# Patient Record
Sex: MALE | Race: WHITE | NOT HISPANIC OR LATINO | Employment: FULL TIME | URBAN - METROPOLITAN AREA
[De-identification: names, ages, dates, MRNs, and addresses within clinical notes are randomized per-mention and may not be internally consistent; named-entity substitution may affect disease eponyms.]

---

## 2017-06-01 DIAGNOSIS — Z11.59 NEED FOR HEPATITIS C SCREENING TEST: Primary | ICD-10-CM

## 2018-09-18 ENCOUNTER — OFFICE VISIT (OUTPATIENT)
Dept: FAMILY MEDICINE | Facility: CLINIC | Age: 63
End: 2018-09-18
Payer: COMMERCIAL

## 2018-09-18 VITALS
OXYGEN SATURATION: 98 % | WEIGHT: 198.13 LBS | TEMPERATURE: 98 F | HEART RATE: 80 BPM | HEIGHT: 68 IN | BODY MASS INDEX: 30.03 KG/M2 | SYSTOLIC BLOOD PRESSURE: 110 MMHG | DIASTOLIC BLOOD PRESSURE: 70 MMHG

## 2018-09-18 DIAGNOSIS — E11.9 TYPE 2 DIABETES MELLITUS WITHOUT COMPLICATION, WITHOUT LONG-TERM CURRENT USE OF INSULIN: Primary | ICD-10-CM

## 2018-09-18 DIAGNOSIS — E78.49 OTHER HYPERLIPIDEMIA: ICD-10-CM

## 2018-09-18 PROCEDURE — 99213 OFFICE O/P EST LOW 20 MIN: CPT | Mod: ,,, | Performed by: FAMILY MEDICINE

## 2018-09-18 PROCEDURE — 3008F BODY MASS INDEX DOCD: CPT | Mod: ,,, | Performed by: FAMILY MEDICINE

## 2018-09-18 RX ORDER — TIZANIDINE 4 MG/1
4 TABLET ORAL EVERY 6 HOURS PRN
COMMUNITY
End: 2018-09-24 | Stop reason: SDUPTHER

## 2018-09-18 RX ORDER — METFORMIN HYDROCHLORIDE 500 MG/1
500 TABLET ORAL 2 TIMES DAILY WITH MEALS
COMMUNITY
End: 2018-12-19 | Stop reason: SDUPTHER

## 2018-09-18 RX ORDER — MONTELUKAST SODIUM 10 MG/1
10 TABLET ORAL NIGHTLY
COMMUNITY
End: 2019-01-09 | Stop reason: SDUPTHER

## 2018-09-18 RX ORDER — ATORVASTATIN CALCIUM 20 MG/1
20 TABLET, FILM COATED ORAL DAILY
COMMUNITY
End: 2019-01-07 | Stop reason: SDUPTHER

## 2018-09-18 NOTE — PROGRESS NOTES
SUBJECTIVE:    Patient ID: Sushil Lucas is a 62 y.o. male.    Chief Complaint: Diabetes; Hyperlipidemia; and Tremors (hand)    HPI  Very pleasant 62-year-old male new to this provider past medical history of  Diabetes and hyperlipidemia, patient also has essential tremor on propanolol. Last A1c is unknown last lipid panels unknown he was a patient of Dr. Pisano's we do not have copies of her notes.    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    Past Medical History:   Diagnosis Date    Diabetes mellitus, type 2     Hyperlipidemia      Social History     Socioeconomic History    Marital status:      Spouse name: Not on file    Number of children: Not on file    Years of education: Not on file    Highest education level: Not on file   Social Needs    Financial resource strain: Not on file    Food insecurity - worry: Not on file    Food insecurity - inability: Not on file    Transportation needs - medical: Not on file    Transportation needs - non-medical: Not on file   Occupational History    Not on file   Tobacco Use    Smoking status: Former Smoker    Smokeless tobacco: Never Used    Tobacco comment: Only when young; stopped many years ago   Substance and Sexual Activity    Alcohol use: No     Comment: 1-2 times per year    Drug use: No    Sexual activity: Not on file   Other Topics Concern    Not on file   Social History Narrative    Not on file     Past Surgical History:   Procedure Laterality Date    AV FISTULA REPAIR      HERNIA REPAIR      SPINE SURGERY  12/2006    C6/7 ACDF     Family History   Problem Relation Age of Onset    Heart disease Father      Current Outpatient Medications    Medication Sig Dispense Refill    atorvastatin (LIPITOR) 20 MG tablet Take 20 mg by mouth once daily.      metFORMIN (GLUCOPHAGE) 500 MG tablet Take 500 mg by mouth 2 (two) times daily with meals.      montelukast (SINGULAIR) 10 mg tablet Take 10 mg by mouth every evening.      naproxen sodium (ANAPROX) 220 MG tablet Take 220 mg by mouth every 12 (twelve) hours.      propranolol (INNOPRAN XL) 80 mg 24 hr capsule Take 1 capsule by mouth 3 (three) times daily.      ranitidine (ZANTAC) 150 MG tablet Take 150 mg by mouth 2 (two) times daily.      tiZANidine (ZANAFLEX) 4 MG tablet Take 4 mg by mouth every 6 (six) hours as needed.       No current facility-administered medications for this visit.      Review of patient's allergies indicates:  No Known Allergies    Review of Systems   Constitutional: Negative for activity change, appetite change, fatigue and fever.   HENT: Negative for congestion, ear pain, hearing loss, postnasal drip, sinus pressure, sinus pain, sneezing and sore throat.    Eyes: Negative for photophobia and pain.   Respiratory: Negative for cough, chest tightness, shortness of breath and wheezing.    Cardiovascular: Negative for chest pain and leg swelling.   Gastrointestinal: Negative for abdominal distention, abdominal pain, blood in stool, constipation, diarrhea, nausea and vomiting.   Endocrine: Negative for cold intolerance, heat intolerance, polydipsia and polyuria.   Genitourinary: Negative for difficulty urinating, dysuria, flank pain, frequency, hematuria and urgency.   Musculoskeletal: Negative for arthralgias, back pain, joint swelling, myalgias and neck pain.   Skin: Negative for pallor and rash.   Allergic/Immunologic: Negative for environmental allergies and food allergies.   Neurological: Negative for dizziness, weakness, light-headedness and headaches.   Hematological: Does not bruise/bleed easily.   Psychiatric/Behavioral: Negative for confusion, decreased concentration and  "sleep disturbance. The patient is not nervous/anxious.           Blood pressure 110/70, pulse 80, temperature 97.6 °F (36.4 °C), height 5' 8" (1.727 m), weight 89.9 kg (198 lb 1.6 oz), SpO2 98 %. Body mass index is 30.12 kg/m².   Objective:      Physical Exam   Constitutional: He is oriented to person, place, and time. He appears well-developed and well-nourished. No distress.   HENT:   Head: Normocephalic and atraumatic.   Right Ear: External ear normal.   Left Ear: External ear normal.   Mouth/Throat: Oropharynx is clear and moist.   Eyes: Conjunctivae are normal. Pupils are equal, round, and reactive to light. Right eye exhibits no discharge. Left eye exhibits no discharge.   Cardiovascular: Normal rate, regular rhythm and normal heart sounds.   No murmur heard.  Pulmonary/Chest: Effort normal and breath sounds normal.   Neurological: He is alert and oriented to person, place, and time.   Skin: Skin is warm and dry. Capillary refill takes less than 2 seconds.           Assessment:       1. Type 2 diabetes mellitus without complication, without long-term current use of insulin         Plan:           Type 2 diabetes mellitus without complication, without long-term current use of insulin  -     Hemoglobin A1c; Future; Expected date: 09/18/2018  -     Lipid panel; Future; Expected date: 09/18/2018    Patient doing well today no complaints does not need any refills on his medications will get his hemoglobin A1c and lipid panel before next visit patient return in 6 months.                    "

## 2018-09-25 RX ORDER — TIZANIDINE 4 MG/1
4 TABLET ORAL EVERY 6 HOURS PRN
Qty: 60 TABLET | Refills: 0 | Status: SHIPPED | OUTPATIENT
Start: 2018-09-25 | End: 2019-01-09 | Stop reason: SDUPTHER

## 2018-11-13 ENCOUNTER — TELEPHONE (OUTPATIENT)
Dept: FAMILY MEDICINE | Facility: CLINIC | Age: 63
End: 2018-11-13

## 2018-12-19 DIAGNOSIS — E11.9 TYPE 2 DIABETES MELLITUS WITHOUT COMPLICATION, WITHOUT LONG-TERM CURRENT USE OF INSULIN: Primary | ICD-10-CM

## 2018-12-20 RX ORDER — METFORMIN HYDROCHLORIDE 500 MG/1
500 TABLET ORAL 2 TIMES DAILY WITH MEALS
Qty: 60 TABLET | Refills: 0 | Status: SHIPPED | OUTPATIENT
Start: 2018-12-20 | End: 2019-01-25 | Stop reason: SDUPTHER

## 2019-01-07 DIAGNOSIS — E11.9 TYPE 2 DIABETES MELLITUS WITHOUT COMPLICATION, WITHOUT LONG-TERM CURRENT USE OF INSULIN: ICD-10-CM

## 2019-01-07 RX ORDER — ATORVASTATIN CALCIUM 20 MG/1
20 TABLET, FILM COATED ORAL DAILY
Qty: 30 TABLET | Refills: 2 | Status: SHIPPED | OUTPATIENT
Start: 2019-01-07 | End: 2019-03-18 | Stop reason: SDUPTHER

## 2019-01-07 NOTE — TELEPHONE ENCOUNTER
Patient's wife called her insurance company and they will not cover the lab work ordered until the end of May this year. I pended the medication he needs a refill on currently. Is it at all possible to fill it and when may arrives he can get the blood work done?

## 2019-01-11 RX ORDER — MONTELUKAST SODIUM 10 MG/1
10 TABLET ORAL NIGHTLY
Qty: 30 TABLET | Refills: 2 | Status: SHIPPED | OUTPATIENT
Start: 2019-01-11 | End: 2019-03-18 | Stop reason: SDUPTHER

## 2019-01-11 RX ORDER — TIZANIDINE 4 MG/1
4 TABLET ORAL EVERY 6 HOURS PRN
Qty: 60 TABLET | Refills: 0 | Status: SHIPPED | OUTPATIENT
Start: 2019-01-11 | End: 2019-04-08 | Stop reason: SDUPTHER

## 2019-01-25 DIAGNOSIS — E11.9 TYPE 2 DIABETES MELLITUS WITHOUT COMPLICATION, WITHOUT LONG-TERM CURRENT USE OF INSULIN: ICD-10-CM

## 2019-01-28 RX ORDER — METFORMIN HYDROCHLORIDE 500 MG/1
TABLET ORAL
Qty: 60 TABLET | Refills: 0 | Status: SHIPPED | OUTPATIENT
Start: 2019-01-28 | End: 2019-02-28 | Stop reason: SDUPTHER

## 2019-02-28 DIAGNOSIS — E11.9 TYPE 2 DIABETES MELLITUS WITHOUT COMPLICATION, WITHOUT LONG-TERM CURRENT USE OF INSULIN: ICD-10-CM

## 2019-02-28 RX ORDER — METFORMIN HYDROCHLORIDE 500 MG/1
TABLET ORAL
Qty: 60 TABLET | Refills: 0 | Status: SHIPPED | OUTPATIENT
Start: 2019-02-28 | End: 2019-03-18 | Stop reason: SDUPTHER

## 2019-03-11 LAB
BUN SERPL-MCNC: 14 MG/DL (ref 8–20)
CALCIUM SERPL-MCNC: 9.1 MG/DL (ref 7.7–10.4)
CHLORIDE: 102 MMOL/L (ref 98–110)
CO2 SERPL-SCNC: 31 MMOL/L (ref 22.8–31.6)
CREATININE: 0.8 MG/DL (ref 0.6–1.4)
GLUCOSE: 153 MG/DL (ref 70–99)
POTASSIUM SERPL-SCNC: 4.8 MMOL/L (ref 3.5–5)
SODIUM: 141 MMOL/L (ref 134–144)

## 2019-03-11 RX ORDER — FLUOROMETHOLONE 1 MG/ML
SUSPENSION/ DROPS OPHTHALMIC
COMMUNITY
Start: 2019-02-27

## 2019-03-18 ENCOUNTER — OFFICE VISIT (OUTPATIENT)
Dept: FAMILY MEDICINE | Facility: CLINIC | Age: 64
End: 2019-03-18
Payer: COMMERCIAL

## 2019-03-18 VITALS
RESPIRATION RATE: 16 BRPM | OXYGEN SATURATION: 99 % | BODY MASS INDEX: 30.63 KG/M2 | WEIGHT: 202.13 LBS | TEMPERATURE: 98 F | SYSTOLIC BLOOD PRESSURE: 132 MMHG | HEART RATE: 75 BPM | HEIGHT: 68 IN | DIASTOLIC BLOOD PRESSURE: 86 MMHG

## 2019-03-18 DIAGNOSIS — E78.49 OTHER HYPERLIPIDEMIA: ICD-10-CM

## 2019-03-18 DIAGNOSIS — E11.9 TYPE 2 DIABETES MELLITUS WITHOUT COMPLICATION, WITHOUT LONG-TERM CURRENT USE OF INSULIN: Primary | ICD-10-CM

## 2019-03-18 DIAGNOSIS — Z11.59 ENCOUNTER FOR HEPATITIS C SCREENING TEST FOR LOW RISK PATIENT: ICD-10-CM

## 2019-03-18 DIAGNOSIS — J01.10 ACUTE NON-RECURRENT FRONTAL SINUSITIS: ICD-10-CM

## 2019-03-18 DIAGNOSIS — M19.90 ARTHRITIS: ICD-10-CM

## 2019-03-18 PROCEDURE — 3008F BODY MASS INDEX DOCD: CPT | Mod: ,,, | Performed by: FAMILY MEDICINE

## 2019-03-18 PROCEDURE — 3008F PR BODY MASS INDEX (BMI) DOCUMENTED: ICD-10-PCS | Mod: ,,, | Performed by: FAMILY MEDICINE

## 2019-03-18 PROCEDURE — 99214 OFFICE O/P EST MOD 30 MIN: CPT | Mod: ,,, | Performed by: FAMILY MEDICINE

## 2019-03-18 PROCEDURE — 99214 PR OFFICE/OUTPT VISIT, EST, LEVL IV, 30-39 MIN: ICD-10-PCS | Mod: ,,, | Performed by: FAMILY MEDICINE

## 2019-03-18 RX ORDER — METFORMIN HYDROCHLORIDE 500 MG/1
TABLET ORAL
Qty: 60 TABLET | Refills: 5 | Status: SHIPPED | OUTPATIENT
Start: 2019-03-18 | End: 2019-10-19 | Stop reason: SDUPTHER

## 2019-03-18 RX ORDER — MONTELUKAST SODIUM 10 MG/1
10 TABLET ORAL NIGHTLY
Qty: 30 TABLET | Refills: 5 | Status: SHIPPED | OUTPATIENT
Start: 2019-03-18 | End: 2019-11-06 | Stop reason: SDUPTHER

## 2019-03-18 RX ORDER — CELECOXIB 100 MG/1
100 CAPSULE ORAL 2 TIMES DAILY
Qty: 60 CAPSULE | Refills: 3 | Status: SHIPPED | OUTPATIENT
Start: 2019-03-18 | End: 2019-07-20 | Stop reason: SDUPTHER

## 2019-03-18 RX ORDER — ATORVASTATIN CALCIUM 20 MG/1
20 TABLET, FILM COATED ORAL DAILY
Qty: 30 TABLET | Refills: 5 | Status: SHIPPED | OUTPATIENT
Start: 2019-03-18 | End: 2019-11-18 | Stop reason: SDUPTHER

## 2019-03-18 RX ORDER — AMOXICILLIN AND CLAVULANATE POTASSIUM 875; 125 MG/1; MG/1
1 TABLET, FILM COATED ORAL EVERY 12 HOURS
Qty: 20 TABLET | Refills: 0 | Status: SHIPPED | OUTPATIENT
Start: 2019-03-18 | End: 2019-09-10 | Stop reason: ALTCHOICE

## 2019-03-18 RX ORDER — FLUTICASONE PROPIONATE 50 MCG
1 SPRAY, SUSPENSION (ML) NASAL DAILY
Qty: 9.9 ML | Refills: 0 | Status: SHIPPED | OUTPATIENT
Start: 2019-03-18 | End: 2020-04-21

## 2019-03-18 NOTE — PROGRESS NOTES
SUBJECTIVE:    Patient ID: Sushil Lucas is a 63 y.o. male.    Chief Complaint: Hyperlipidemia and Diabetes    62 yo male presents to clinic to follow up on several chronic medical conditions (DM, Hyperlipidemia) and with 2 new complaints (sinus pain and bilateral knee arthritis).    DM; pt has not had an A1C in several months he is taking his medications, but since she is only on orals she is not checking his blood sugar.    Hyperlipidemia, no lipid panel on file, will have pt get his lipid panel before next visit.    Sinusitis, pt states that he has been having symptoms for 3 weeks with sinus pain and pressure, thick white nasal drainage, the feeling of shifting fluid in his head. Subjective fevers.    Arthritis, pt has bilateral knee pain has had images in the past, pain worse in the morning that improves with activity, no erythema or edema. + crepitus.      Diabetes   He presents for his follow-up diabetic visit. He has type 2 diabetes mellitus. There are no hypoglycemic associated symptoms. Pertinent negatives for hypoglycemia include no headaches. There are no diabetic associated symptoms. Pertinent negatives for diabetes include no chest pain, no fatigue and no weight loss. There are no hypoglycemic complications. Symptoms are stable. There are no diabetic complications. Risk factors for coronary artery disease include dyslipidemia, diabetes mellitus, hypertension and male sex. Current diabetic treatment includes oral agent (monotherapy). He is compliant with treatment all of the time. His weight is stable. He is following a generally healthy diet. When asked about meal planning, he reported none. He has not had a previous visit with a dietitian. He participates in exercise three times a week. Home blood sugar record trend: Unknown no A1C on file. An ACE inhibitor/angiotensin II receptor blocker is not being taken. He does not see a podiatrist.Eye exam is not current.   Hyperlipidemia   This is a chronic  problem. The current episode started more than 1 year ago. Condition status: unknown. Recent lipid tests were reviewed and are normal. Exacerbating diseases include diabetes. Factors aggravating his hyperlipidemia include beta blockers. Pertinent negatives include no chest pain, focal sensory loss, focal weakness, leg pain, myalgias or shortness of breath. Current antihyperlipidemic treatment includes statins. The current treatment provides mild improvement of lipids. There are no compliance problems.    Sinusitis   This is a new problem. The current episode started today. The problem has been gradually worsening since onset. There has been no fever. His pain is at a severity of 3/10. Associated symptoms include congestion, coughing and sinus pressure. Pertinent negatives include no chills, diaphoresis, ear pain, headaches, hoarse voice, neck pain, shortness of breath, sneezing, sore throat or swollen glands. Past treatments include nothing.   Arthritis   Presents for follow-up visit. He complains of pain and stiffness. He reports no joint warmth. The symptoms have been stable. Affected locations include the right knee and left knee. His pain is at a severity of 4/10. Pertinent negatives include no diarrhea, dry eyes, dry mouth, dysuria, fatigue, fever, pain at night, pain while resting, rash, Raynaud's syndrome, uveitis or weight loss.         Past Medical History:   Diagnosis Date    Diabetes mellitus, type 2     Hyperlipidemia      Social History     Socioeconomic History    Marital status:      Spouse name: Not on file    Number of children: Not on file    Years of education: Not on file    Highest education level: Not on file   Social Needs    Financial resource strain: Not on file    Food insecurity - worry: Not on file    Food insecurity - inability: Not on file    Transportation needs - medical: Not on file    Transportation needs - non-medical: Not on file   Occupational History    Not on  file   Tobacco Use    Smoking status: Never Smoker    Smokeless tobacco: Never Used   Substance and Sexual Activity    Alcohol use: No     Comment: 1-2 times per year    Drug use: No    Sexual activity: Not on file   Other Topics Concern    Not on file   Social History Narrative    Not on file     Past Surgical History:   Procedure Laterality Date    AV FISTULA REPAIR      HERNIA REPAIR      SPINE SURGERY  12/2006    C6/7 ACDF     Family History   Problem Relation Age of Onset    Heart disease Father      Current Outpatient Medications   Medication Sig Dispense Refill    atorvastatin (LIPITOR) 20 MG tablet Take 1 tablet (20 mg total) by mouth once daily. 30 tablet 5    fluorometholone 0.1% (FML) 0.1 % DrpS       metFORMIN (GLUCOPHAGE) 500 MG tablet TAKE 1 TABLET(500 MG) BY MOUTH TWICE DAILY WITH MEALS 60 tablet 5    montelukast (SINGULAIR) 10 mg tablet Take 1 tablet (10 mg total) by mouth every evening. 30 tablet 5    propranolol (INNOPRAN XL) 80 mg 24 hr capsule Take 1 capsule (80 mg total) by mouth 3 (three) times daily. 90 capsule 5    ranitidine (ZANTAC) 150 MG tablet Take 150 mg by mouth 2 (two) times daily.      tiZANidine (ZANAFLEX) 4 MG tablet Take 1 tablet (4 mg total) by mouth every 6 (six) hours as needed. 60 tablet 0    amoxicillin-clavulanate 875-125mg (AUGMENTIN) 875-125 mg per tablet Take 1 tablet by mouth every 12 (twelve) hours. 20 tablet 0    celecoxib (CELEBREX) 100 MG capsule Take 1 capsule (100 mg total) by mouth 2 (two) times daily. 60 capsule 3    fluticasone (FLONASE) 50 mcg/actuation nasal spray 1 spray (50 mcg total) by Each Nare route once daily. 9.9 mL 0     No current facility-administered medications for this visit.      Review of patient's allergies indicates:  No Known Allergies    Review of Systems   Constitutional: Negative for activity change, chills, diaphoresis, fatigue, fever and weight loss.   HENT: Positive for congestion, postnasal drip, rhinorrhea, sinus  "pressure and sinus pain. Negative for ear pain, hoarse voice, sneezing and sore throat.    Eyes: Negative for pain, discharge, redness and itching.   Respiratory: Positive for cough and chest tightness. Negative for shortness of breath, wheezing and stridor.    Cardiovascular: Negative for chest pain, palpitations and leg swelling.   Gastrointestinal: Negative for diarrhea, nausea, rectal pain and vomiting.   Genitourinary: Negative for dysuria, flank pain, frequency and urgency.   Musculoskeletal: Positive for arthralgias (bilateral knee pain), arthritis and stiffness. Negative for myalgias and neck pain.   Skin: Negative for rash.   Neurological: Negative for focal weakness and headaches.          Blood pressure 132/86, pulse 75, temperature 97.6 °F (36.4 °C), temperature source Oral, resp. rate 16, height 5' 8" (1.727 m), weight 91.7 kg (202 lb 1.6 oz), SpO2 99 %. Body mass index is 30.73 kg/m².   Objective:      Physical Exam   Constitutional: He is oriented to person, place, and time. He appears well-developed and well-nourished. No distress.   HENT:   Head: Normocephalic and atraumatic.   Right Ear: External ear normal.   Left Ear: External ear normal.   Nose: Nose normal.   Mouth/Throat: No oropharyngeal exudate.   + post nasal drip  + frontal sinus pain with palpation  + nasal discharge     Eyes: Conjunctivae and EOM are normal. Pupils are equal, round, and reactive to light. Right eye exhibits no discharge. Left eye exhibits no discharge.   Neck: Normal range of motion. Neck supple. No thyromegaly present.   Cardiovascular: Normal rate, regular rhythm and normal heart sounds.   No murmur heard.  Pulmonary/Chest: Effort normal and breath sounds normal. No stridor. No respiratory distress. He has no wheezes. He has no rales.   Lymphadenopathy:     He has no cervical adenopathy.   Neurological: He is alert and oriented to person, place, and time.   Skin: Skin is warm and dry. Capillary refill takes less than 2 " seconds. No rash noted. He is not diaphoretic.           Assessment:       1. Type 2 diabetes mellitus without complication, without long-term current use of insulin    2. Other hyperlipidemia    3. Arthritis    4. Acute non-recurrent frontal sinusitis    5. Encounter for hepatitis C screening test for low risk patient         Plan:           Type 2 diabetes mellitus without complication, without long-term current use of insulin  -     Hemoglobin A1c; Future; Expected date: 03/18/2019  -     metFORMIN (GLUCOPHAGE) 500 MG tablet; TAKE 1 TABLET(500 MG) BY MOUTH TWICE DAILY WITH MEALS  Dispense: 60 tablet; Refill: 5  I have asked pt to decrease his carbohydrate intake, will get A1C before next visit to determine if his medications need to be adjusted.    Other hyperlipidemia  -     Lipid panel; Future; Expected date: 03/18/2019  -     atorvastatin (LIPITOR) 20 MG tablet; Take 1 tablet (20 mg total) by mouth once daily.  Dispense: 30 tablet; Refill: 5  No lipid panel on file will get before next visit.    Arthritis  -     celecoxib (CELEBREX) 100 MG capsule; Take 1 capsule (100 mg total) by mouth 2 (two) times daily.  Dispense: 60 capsule; Refill: 3  Will consider intraarticular steroid injections if not improved on celebrex.    Acute non-recurrent frontal sinusitis  -     amoxicillin-clavulanate 875-125mg (AUGMENTIN) 875-125 mg per tablet; Take 1 tablet by mouth every 12 (twelve) hours.  Dispense: 20 tablet; Refill: 0  -     fluticasone (FLONASE) 50 mcg/actuation nasal spray; 1 spray (50 mcg total) by Each Nare route once daily.  Dispense: 9.9 mL; Refill: 0  -     montelukast (SINGULAIR) 10 mg tablet; Take 1 tablet (10 mg total) by mouth every evening.  Dispense: 30 tablet; Refill: 5  Positive frontal sinus pressure suspect bacterial sinusitis, will treat with abx. Pt to follow up if his symptoms persist or get worse or he develops fevers.    Encounter for hepatitis C screening test for low risk patient  -      Hepatitis C antibody; Future; Expected date: 03/18/2019  Routine health maint.    Other orders  -     Cancel: Influenza - Quadrivalent (3 years & older) (PF)  -     Cancel: Tdap Vaccine  -     Cancel: adjuvant AS01B, PF,vial 1 of 2 (SHINGRIX ADJUVANT COMPONENT-PF) Susp; Inject 0.5 mLs into the muscle once. for 1 dose  Dispense: 0.5 mL; Refill: 0  -     propranolol (INNOPRAN XL) 80 mg 24 hr capsule; Take 1 capsule (80 mg total) by mouth 3 (three) times daily.  Dispense: 90 capsule; Refill: 5

## 2019-03-19 ENCOUNTER — TELEPHONE (OUTPATIENT)
Dept: FAMILY MEDICINE | Facility: CLINIC | Age: 64
End: 2019-03-19

## 2019-03-19 RX ORDER — PROPRANOLOL HYDROCHLORIDE 80 MG/1
80 CAPSULE, EXTENDED RELEASE ORAL DAILY
Qty: 30 CAPSULE | Refills: 11 | Status: SHIPPED | OUTPATIENT
Start: 2019-03-19 | End: 2019-05-12 | Stop reason: SDUPTHER

## 2019-03-19 NOTE — TELEPHONE ENCOUNTER
Per pharmacy patient insurance will not cover innopran but instead will cover propranol HCL. I am unsure of the dosage for this medication. Please send to the local pharmacy. Thank you!

## 2019-04-08 RX ORDER — TIZANIDINE 4 MG/1
TABLET ORAL
Qty: 60 TABLET | Refills: 0 | Status: SHIPPED | OUTPATIENT
Start: 2019-04-08 | End: 2019-06-27 | Stop reason: SDUPTHER

## 2019-05-06 ENCOUNTER — CLINICAL SUPPORT (OUTPATIENT)
Dept: OTHER | Facility: CLINIC | Age: 64
End: 2019-05-06
Payer: COMMERCIAL

## 2019-05-06 DIAGNOSIS — Z00.8 HEALTH EXAMINATION IN POPULATION SURVEYS: Primary | ICD-10-CM

## 2019-05-06 PROCEDURE — 82947 PR  ASSAY QUANTITATIVE,BLOOD GLUCOSE: ICD-10-PCS | Mod: QW,S$GLB,, | Performed by: INTERNAL MEDICINE

## 2019-05-06 PROCEDURE — 83036 HEMOGLOBIN GLYCOSYLATED A1C: CPT | Mod: QW,S$GLB,, | Performed by: INTERNAL MEDICINE

## 2019-05-06 PROCEDURE — 80061 LIPID PANEL: CPT | Mod: QW,S$GLB,, | Performed by: INTERNAL MEDICINE

## 2019-05-06 PROCEDURE — 99401 PREV MED CNSL INDIV APPRX 15: CPT | Mod: S$GLB,,, | Performed by: INTERNAL MEDICINE

## 2019-05-06 PROCEDURE — 80061 PR  LIPID PANEL: ICD-10-PCS | Mod: QW,S$GLB,, | Performed by: INTERNAL MEDICINE

## 2019-05-06 PROCEDURE — 83036 POCT GLYCOSYLATED HEMOGLOBIN (HGB A1C): ICD-10-PCS | Mod: QW,S$GLB,, | Performed by: INTERNAL MEDICINE

## 2019-05-06 PROCEDURE — 82947 ASSAY GLUCOSE BLOOD QUANT: CPT | Mod: QW,S$GLB,, | Performed by: INTERNAL MEDICINE

## 2019-05-06 PROCEDURE — 99401 PR PREVENT COUNSEL,INDIV,15 MIN: ICD-10-PCS | Mod: S$GLB,,, | Performed by: INTERNAL MEDICINE

## 2019-05-07 LAB
GLUCOSE SERPL-MCNC: 109 MG/DL (ref 60–140)
HBA1C MFR BLD: 6 % (ref 0–5.7)
POC CHOLESTEROL, HDL: 44 MG/DL (ref 40–?)
POC CHOLESTEROL, LDL: 47 MG/DL (ref ?–160)
POC CHOLESTEROL, TOTAL: 130 MG/DL (ref ?–240)
POC GLUCOSE FASTING: ABNORMAL MG/DL (ref 60–110)
POC TOTAL CHOLESTEROL / HDL RATIO: 3 (ref ?–6)
POC TRIGLYCERIDES: 197 MG/DL (ref ?–160)

## 2019-05-08 VITALS
SYSTOLIC BLOOD PRESSURE: 122 MMHG | HEIGHT: 68 IN | BODY MASS INDEX: 30.31 KG/M2 | WEIGHT: 200 LBS | DIASTOLIC BLOOD PRESSURE: 80 MMHG

## 2019-05-08 LAB
GLUCOSE SERPL-MCNC: 109 MG/DL (ref 60–140)
HBA1C MFR BLD: 6 %
HDLC SERPL-MCNC: 44 MG/DL
POC CHOLESTEROL, LDL (DOCK): 47 MG/DL
POC CHOLESTEROL, TOTAL: 130 MG/DL
TRIGL SERPL-MCNC: 197 MG/DL

## 2019-05-09 ENCOUNTER — TELEPHONE (OUTPATIENT)
Dept: FAMILY MEDICINE | Facility: CLINIC | Age: 64
End: 2019-05-09

## 2019-05-09 NOTE — TELEPHONE ENCOUNTER
Pt wife called and stated  has been taking Propranolol 80 mg one PO TID and get a 90 day supply for a long time but his last refill states he is only to take it once a day with only a 30 day supply. Would like to know if you meant to change it to daily. He does not recall if you did or did not. Please advise

## 2019-05-09 NOTE — TELEPHONE ENCOUNTER
Elizabet I think I put him on the Propranolol (Inderal LA) the LA is for long acting, it is supposed to be a once a day medication, we were going to see if the Long Acting medication work as well as the short acting for his symptoms. Ask If he would prefer the 3 times a day dosing, we can cancel and you can pend the medications he was on previously.

## 2019-05-10 NOTE — TELEPHONE ENCOUNTER
Patient states that he is wanting to go back to what he was used to doing. He feels like the other way was working better than the once a day.

## 2019-05-15 NOTE — TELEPHONE ENCOUNTER
The following medication needs a prior authorization:     Medication Name: propranolol xl    Dosage: 80 mg    Frequency: daily    Directions for use: one tab TID    Diagnosis: HYPERTENSION    Is the request for a reauthorization? NO     Is the patient currently stable on therapy? NEW THERAPY    Please list all therapeutic alternatives previously used with start/end dates and outcome:    TOPROL XL 50 MG  STOPPED IN 9/18/2018 BY ANOTHER PROVIDER WITHOUT AN EXPLAINATION

## 2019-05-16 RX ORDER — PROPRANOLOL HYDROCHLORIDE 80 MG/1
80 TABLET ORAL 3 TIMES DAILY
Qty: 90 TABLET | Refills: 11 | Status: SHIPPED | OUTPATIENT
Start: 2019-05-16 | End: 2020-06-24

## 2019-05-16 RX ORDER — PROPRANOLOL HYDROCHLORIDE 80 MG/1
80 CAPSULE, EXTENDED RELEASE ORAL DAILY
Qty: 30 CAPSULE | Refills: 11 | Status: CANCELLED | OUTPATIENT
Start: 2019-05-16 | End: 2020-05-15

## 2019-06-27 RX ORDER — TIZANIDINE 4 MG/1
TABLET ORAL
Qty: 60 TABLET | Refills: 0 | Status: SHIPPED | OUTPATIENT
Start: 2019-06-27 | End: 2019-09-24 | Stop reason: SDUPTHER

## 2019-07-20 DIAGNOSIS — M19.90 ARTHRITIS: ICD-10-CM

## 2019-07-22 RX ORDER — CELECOXIB 100 MG/1
CAPSULE ORAL
Qty: 60 CAPSULE | Refills: 0 | Status: SHIPPED | OUTPATIENT
Start: 2019-07-22 | End: 2019-08-26 | Stop reason: SDUPTHER

## 2019-08-26 DIAGNOSIS — M19.90 ARTHRITIS: ICD-10-CM

## 2019-08-26 RX ORDER — CELECOXIB 100 MG/1
CAPSULE ORAL
Qty: 60 CAPSULE | Refills: 0 | Status: SHIPPED | OUTPATIENT
Start: 2019-08-26 | End: 2019-09-16 | Stop reason: SDUPTHER

## 2019-09-16 ENCOUNTER — OFFICE VISIT (OUTPATIENT)
Dept: FAMILY MEDICINE | Facility: CLINIC | Age: 64
End: 2019-09-16
Payer: COMMERCIAL

## 2019-09-16 VITALS
WEIGHT: 202.5 LBS | RESPIRATION RATE: 18 BRPM | BODY MASS INDEX: 30.69 KG/M2 | HEIGHT: 68 IN | OXYGEN SATURATION: 98 % | DIASTOLIC BLOOD PRESSURE: 66 MMHG | HEART RATE: 65 BPM | TEMPERATURE: 98 F | SYSTOLIC BLOOD PRESSURE: 116 MMHG

## 2019-09-16 DIAGNOSIS — E11.9 TYPE 2 DIABETES MELLITUS WITHOUT COMPLICATION, WITHOUT LONG-TERM CURRENT USE OF INSULIN: ICD-10-CM

## 2019-09-16 DIAGNOSIS — M19.90 ARTHRITIS: ICD-10-CM

## 2019-09-16 DIAGNOSIS — I10 ESSENTIAL HYPERTENSION: Primary | ICD-10-CM

## 2019-09-16 DIAGNOSIS — E78.49 OTHER HYPERLIPIDEMIA: ICD-10-CM

## 2019-09-16 PROCEDURE — 3044F PR MOST RECENT HEMOGLOBIN A1C LEVEL <7.0%: ICD-10-PCS | Mod: S$GLB,,, | Performed by: FAMILY MEDICINE

## 2019-09-16 PROCEDURE — 3008F BODY MASS INDEX DOCD: CPT | Mod: S$GLB,,, | Performed by: FAMILY MEDICINE

## 2019-09-16 PROCEDURE — 99214 OFFICE O/P EST MOD 30 MIN: CPT | Mod: S$GLB,,, | Performed by: FAMILY MEDICINE

## 2019-09-16 PROCEDURE — 99999 PR PBB SHADOW E&M-EST. PATIENT-LVL IV: CPT | Mod: PBBFAC,,, | Performed by: FAMILY MEDICINE

## 2019-09-16 PROCEDURE — 99999 PR PBB SHADOW E&M-EST. PATIENT-LVL IV: ICD-10-PCS | Mod: PBBFAC,,, | Performed by: FAMILY MEDICINE

## 2019-09-16 PROCEDURE — 3008F PR BODY MASS INDEX (BMI) DOCUMENTED: ICD-10-PCS | Mod: S$GLB,,, | Performed by: FAMILY MEDICINE

## 2019-09-16 PROCEDURE — 99214 PR OFFICE/OUTPT VISIT, EST, LEVL IV, 30-39 MIN: ICD-10-PCS | Mod: S$GLB,,, | Performed by: FAMILY MEDICINE

## 2019-09-16 PROCEDURE — 3044F HG A1C LEVEL LT 7.0%: CPT | Mod: S$GLB,,, | Performed by: FAMILY MEDICINE

## 2019-09-16 RX ORDER — CELECOXIB 100 MG/1
CAPSULE ORAL
Qty: 60 CAPSULE | Refills: 0 | Status: SHIPPED | OUTPATIENT
Start: 2019-09-16 | End: 2019-11-20 | Stop reason: SDUPTHER

## 2019-09-16 NOTE — PROGRESS NOTES
SUBJECTIVE:    Patient ID: Sushil Lucas is a 63 y.o. male.    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    HTN: Currently on Propranolol, well controlled:  Hyperlipidemia, well controlled on Lipitor 20mg Lipids WNL  DM: on Metformin 500mg BID, last A1C was 6.    Hypertension   This is a chronic problem. The current episode started more than 1 year ago. The problem is unchanged. The problem is controlled. Pertinent negatives include no chest pain, headaches or shortness of breath. Risk factors for coronary artery disease include diabetes mellitus, dyslipidemia and male gender. Past treatments include beta blockers. The current treatment provides moderate improvement. There are no compliance problems.    Hyperlipidemia   This is a chronic problem. The current episode started more than 1 year ago. The problem is controlled. Recent lipid tests were reviewed and are normal. Exacerbating diseases include diabetes. There are no known factors aggravating his hyperlipidemia. Pertinent negatives include no chest pain or shortness of breath. Current antihyperlipidemic treatment includes statins. The current treatment provides moderate improvement of lipids.   Diabetes   He presents for his follow-up diabetic visit. He has type 2 diabetes mellitus. His disease course has been stable. Pertinent negatives for hypoglycemia include no confusion, dizziness, headaches, nervousness/anxiousness or pallor. Pertinent negatives for diabetes include no chest pain, no fatigue, no polydipsia, no polyuria and no weakness. There are no hypoglycemic complications. Symptoms are stable. There are no diabetic complications. Risk factors for coronary artery disease include diabetes mellitus, dyslipidemia,  hypertension and male sex. Current diabetic treatment includes oral agent (monotherapy).   Arthritis   Presents for follow-up visit. He complains of pain and stiffness. Affected locations include the left shoulder and right shoulder. His pain is at a severity of 4/10. Pertinent negatives include no diarrhea, dysuria, fatigue, fever or rash.       POC Hemoglobin A1C <7 % 6      POC Cholesterol, Total 240 mg/dL 130    POC Cholesterol,  mg/dL 47    POC Cholesterol, HDL 40 mg/dL 44    POC Total Cholesterol / HDL Ratio 6 3.0    Triglycerides,  mg/dL 197Abnormal     POC Glucose Fasting 60 - 110 mg/dL    POC Glucose 60 - 140 mg/dL 109          Past Medical History:   Diagnosis Date    Diabetes mellitus, type 2     Hyperlipidemia      Social History     Socioeconomic History    Marital status:      Spouse name: Not on file    Number of children: Not on file    Years of education: Not on file    Highest education level: Not on file   Occupational History    Not on file   Social Needs    Financial resource strain: Not on file    Food insecurity:     Worry: Not on file     Inability: Not on file    Transportation needs:     Medical: Not on file     Non-medical: Not on file   Tobacco Use    Smoking status: Never Smoker    Smokeless tobacco: Never Used   Substance and Sexual Activity    Alcohol use: No     Comment: 1-2 times per year    Drug use: No    Sexual activity: Not on file   Lifestyle    Physical activity:     Days per week: Not on file     Minutes per session: Not on file    Stress: Not at all   Relationships    Social connections:     Talks on phone: Not on file     Gets together: Not on file     Attends Confucianism service: Not on file     Active member of club or organization: Not on file     Attends meetings of clubs or organizations: Not on file     Relationship status: Not on file   Other Topics Concern    Not on file   Social History Narrative    Not on file     Past  Surgical History:   Procedure Laterality Date    AV FISTULA REPAIR      HERNIA REPAIR      SPINE SURGERY  12/2006    C6/7 ACDF     Family History   Problem Relation Age of Onset    Heart disease Father      Current Outpatient Medications   Medication Sig Dispense Refill    atorvastatin (LIPITOR) 20 MG tablet Take 1 tablet (20 mg total) by mouth once daily. 30 tablet 5    celecoxib (CELEBREX) 100 MG capsule TAKE 1 CAPSULE(100 MG) BY MOUTH TWICE DAILY 60 capsule 0    fluorometholone 0.1% (FML) 0.1 % DrpS       fluticasone (FLONASE) 50 mcg/actuation nasal spray 1 spray (50 mcg total) by Each Nare route once daily. 9.9 mL 0    metFORMIN (GLUCOPHAGE) 500 MG tablet TAKE 1 TABLET(500 MG) BY MOUTH TWICE DAILY WITH MEALS 60 tablet 5    montelukast (SINGULAIR) 10 mg tablet Take 1 tablet (10 mg total) by mouth every evening. 30 tablet 5    propranolol (INDERAL) 80 MG tablet Take 1 tablet (80 mg total) by mouth 3 (three) times daily. 90 tablet 11    ranitidine (ZANTAC) 150 MG tablet Take 150 mg by mouth 2 (two) times daily.      tiZANidine (ZANAFLEX) 4 MG tablet TAKE 1 TABLET(4 MG) BY MOUTH EVERY 6 HOURS AS NEEDED 60 tablet 0     No current facility-administered medications for this visit.      Review of patient's allergies indicates:  No Known Allergies    Review of Systems   Constitutional: Negative for activity change, appetite change, fatigue and fever.   HENT: Negative for congestion, ear pain, hearing loss, postnasal drip, sinus pressure, sinus pain, sneezing and sore throat.    Eyes: Negative for photophobia and pain.   Respiratory: Negative for cough, chest tightness, shortness of breath and wheezing.    Cardiovascular: Negative for chest pain and leg swelling.   Gastrointestinal: Negative for abdominal distention, abdominal pain, blood in stool, constipation, diarrhea, nausea and vomiting.   Endocrine: Negative for cold intolerance, heat intolerance, polydipsia and polyuria.   Genitourinary: Negative for  "difficulty urinating, dysuria, flank pain, frequency, hematuria and urgency.   Musculoskeletal: Positive for arthralgias (bilateral shoulders and knees), arthritis and stiffness.   Skin: Negative for pallor and rash.   Allergic/Immunologic: Negative for environmental allergies and food allergies.   Neurological: Negative for dizziness, weakness, light-headedness and headaches.   Hematological: Does not bruise/bleed easily.   Psychiatric/Behavioral: Negative for confusion, decreased concentration and sleep disturbance. The patient is not nervous/anxious.           Blood pressure 116/66, pulse 65, temperature 97.9 °F (36.6 °C), temperature source Oral, resp. rate 18, height 5' 8" (1.727 m), weight 91.9 kg (202 lb 8 oz), SpO2 98 %. Body mass index is 30.79 kg/m².   Objective:      Physical Exam   Constitutional: He appears well-developed and well-nourished. No distress.   HENT:   Head: Normocephalic and atraumatic.   Right Ear: External ear normal.   Left Ear: External ear normal.   Mouth/Throat: Oropharynx is clear and moist.   Eyes: Pupils are equal, round, and reactive to light. Conjunctivae are normal. Right eye exhibits no discharge. Left eye exhibits no discharge.   Cardiovascular: Normal rate, regular rhythm and normal heart sounds.   No murmur heard.  Pulmonary/Chest: Effort normal and breath sounds normal.   Skin: Skin is warm and dry.           Assessment:       1. Other hyperlipidemia    2. Arthritis    3. Type 2 diabetes mellitus without complication, without long-term current use of insulin    4. Essential hypertension         Plan:           Other hyperlipidemia  Limit red meat, butter, fried foods, cheese, and other foods that have a lot of saturated fat. Consume more: lean meats, fish, fruits, vegetables, whole grains, beans, lentils, and nuts.  Weight loss, and 30-45 min of cardiovascular exercise daily.  Arthritis  -     celecoxib (CELEBREX) 100 MG capsule; TAKE 1 CAPSULE(100 MG) BY MOUTH TWICE DAILY  " Dispense: 60 capsule; Refill: 0  Pt to continue activity and take his medications as needed.  Type 2 diabetes mellitus without complication, without long-term current use of insulin  Pt doing well last A1C is controlled, he is controlling his diabetes with diet and exercise mainly.    Essential hypertension  Reduce the amount of salt in your diet; Lose weight; Avoid drinking too much alcohol; Exercise at least 30 minutes per day most days of the week.  Continue current medications and home BP monitoring.

## 2019-09-25 RX ORDER — TIZANIDINE 4 MG/1
TABLET ORAL
Qty: 60 TABLET | Refills: 0 | Status: SHIPPED | OUTPATIENT
Start: 2019-09-25 | End: 2020-03-02

## 2019-10-03 DIAGNOSIS — M19.90 ARTHRITIS: ICD-10-CM

## 2019-10-03 RX ORDER — CELECOXIB 100 MG/1
CAPSULE ORAL
Qty: 60 CAPSULE | Refills: 0 | Status: SHIPPED | OUTPATIENT
Start: 2019-10-03 | End: 2019-11-21 | Stop reason: SDUPTHER

## 2019-10-19 DIAGNOSIS — E11.9 TYPE 2 DIABETES MELLITUS WITHOUT COMPLICATION, WITHOUT LONG-TERM CURRENT USE OF INSULIN: ICD-10-CM

## 2019-10-20 RX ORDER — METFORMIN HYDROCHLORIDE 500 MG/1
TABLET ORAL
Qty: 60 TABLET | Refills: 0 | Status: SHIPPED | OUTPATIENT
Start: 2019-10-20 | End: 2019-11-26 | Stop reason: SDUPTHER

## 2019-11-06 DIAGNOSIS — J01.10 ACUTE NON-RECURRENT FRONTAL SINUSITIS: ICD-10-CM

## 2019-11-06 RX ORDER — MONTELUKAST SODIUM 10 MG/1
TABLET ORAL
Qty: 30 TABLET | Refills: 6 | Status: SHIPPED | OUTPATIENT
Start: 2019-11-06 | End: 2020-08-03 | Stop reason: SDUPTHER

## 2019-11-18 DIAGNOSIS — E78.49 OTHER HYPERLIPIDEMIA: ICD-10-CM

## 2019-11-18 RX ORDER — ATORVASTATIN CALCIUM 20 MG/1
TABLET, FILM COATED ORAL
Qty: 30 TABLET | Refills: 0 | Status: SHIPPED | OUTPATIENT
Start: 2019-11-18 | End: 2019-12-24

## 2019-11-20 DIAGNOSIS — M19.90 ARTHRITIS: ICD-10-CM

## 2019-11-21 RX ORDER — CELECOXIB 100 MG/1
CAPSULE ORAL
Qty: 60 CAPSULE | Refills: 0 | Status: SHIPPED | OUTPATIENT
Start: 2019-11-21 | End: 2020-01-02

## 2019-11-26 DIAGNOSIS — E11.9 TYPE 2 DIABETES MELLITUS WITHOUT COMPLICATION, WITHOUT LONG-TERM CURRENT USE OF INSULIN: ICD-10-CM

## 2019-11-26 RX ORDER — METFORMIN HYDROCHLORIDE 500 MG/1
TABLET ORAL
Qty: 60 TABLET | Refills: 0 | Status: SHIPPED | OUTPATIENT
Start: 2019-11-26 | End: 2020-01-02

## 2019-12-22 DIAGNOSIS — E78.49 OTHER HYPERLIPIDEMIA: ICD-10-CM

## 2019-12-24 RX ORDER — ATORVASTATIN CALCIUM 20 MG/1
TABLET, FILM COATED ORAL
Qty: 30 TABLET | Refills: 0 | Status: SHIPPED | OUTPATIENT
Start: 2019-12-24 | End: 2020-01-30

## 2020-01-02 DIAGNOSIS — M19.90 ARTHRITIS: ICD-10-CM

## 2020-01-02 DIAGNOSIS — E11.9 TYPE 2 DIABETES MELLITUS WITHOUT COMPLICATION, WITHOUT LONG-TERM CURRENT USE OF INSULIN: ICD-10-CM

## 2020-01-02 RX ORDER — METFORMIN HYDROCHLORIDE 500 MG/1
TABLET ORAL
Qty: 60 TABLET | Refills: 0 | Status: SHIPPED | OUTPATIENT
Start: 2020-01-02 | End: 2020-02-11

## 2020-01-02 RX ORDER — CELECOXIB 100 MG/1
CAPSULE ORAL
Qty: 60 CAPSULE | Refills: 0 | Status: SHIPPED | OUTPATIENT
Start: 2020-01-02 | End: 2020-02-12

## 2020-01-30 DIAGNOSIS — E78.49 OTHER HYPERLIPIDEMIA: ICD-10-CM

## 2020-01-30 RX ORDER — ATORVASTATIN CALCIUM 20 MG/1
TABLET, FILM COATED ORAL
Qty: 30 TABLET | Refills: 0 | Status: SHIPPED | OUTPATIENT
Start: 2020-01-30 | End: 2020-03-10

## 2020-02-10 DIAGNOSIS — E11.9 TYPE 2 DIABETES MELLITUS WITHOUT COMPLICATION, WITHOUT LONG-TERM CURRENT USE OF INSULIN: ICD-10-CM

## 2020-02-11 RX ORDER — METFORMIN HYDROCHLORIDE 500 MG/1
TABLET ORAL
Qty: 60 TABLET | Refills: 0 | Status: SHIPPED | OUTPATIENT
Start: 2020-02-11 | End: 2020-03-24

## 2020-02-12 DIAGNOSIS — M19.90 ARTHRITIS: ICD-10-CM

## 2020-02-12 RX ORDER — CELECOXIB 100 MG/1
CAPSULE ORAL
Qty: 60 CAPSULE | Refills: 0 | Status: SHIPPED | OUTPATIENT
Start: 2020-02-12 | End: 2020-03-24

## 2020-03-02 RX ORDER — TIZANIDINE 4 MG/1
TABLET ORAL
Qty: 60 TABLET | Refills: 0 | Status: SHIPPED | OUTPATIENT
Start: 2020-03-02 | End: 2020-08-03 | Stop reason: SDUPTHER

## 2020-03-10 DIAGNOSIS — E78.49 OTHER HYPERLIPIDEMIA: ICD-10-CM

## 2020-03-10 RX ORDER — ATORVASTATIN CALCIUM 20 MG/1
TABLET, FILM COATED ORAL
Qty: 30 TABLET | Refills: 0 | Status: SHIPPED | OUTPATIENT
Start: 2020-03-10 | End: 2020-04-28

## 2020-03-23 DIAGNOSIS — E11.9 TYPE 2 DIABETES MELLITUS WITHOUT COMPLICATION, WITHOUT LONG-TERM CURRENT USE OF INSULIN: ICD-10-CM

## 2020-03-23 DIAGNOSIS — M19.90 ARTHRITIS: ICD-10-CM

## 2020-03-24 RX ORDER — CELECOXIB 100 MG/1
CAPSULE ORAL
Qty: 60 CAPSULE | Refills: 0 | Status: SHIPPED | OUTPATIENT
Start: 2020-03-24 | End: 2020-05-05

## 2020-03-24 RX ORDER — METFORMIN HYDROCHLORIDE 500 MG/1
TABLET ORAL
Qty: 60 TABLET | Refills: 0 | Status: SHIPPED | OUTPATIENT
Start: 2020-03-24 | End: 2020-05-05

## 2020-04-21 ENCOUNTER — OFFICE VISIT (OUTPATIENT)
Dept: FAMILY MEDICINE | Facility: CLINIC | Age: 65
End: 2020-04-21
Payer: COMMERCIAL

## 2020-04-21 VITALS
HEART RATE: 80 BPM | DIASTOLIC BLOOD PRESSURE: 88 MMHG | TEMPERATURE: 98 F | HEIGHT: 68 IN | OXYGEN SATURATION: 98 % | BODY MASS INDEX: 31.6 KG/M2 | WEIGHT: 208.5 LBS | SYSTOLIC BLOOD PRESSURE: 144 MMHG

## 2020-04-21 DIAGNOSIS — R05.9 COUGH: Primary | ICD-10-CM

## 2020-04-21 PROCEDURE — 99213 PR OFFICE/OUTPT VISIT, EST, LEVL III, 20-29 MIN: ICD-10-PCS | Mod: S$GLB,,, | Performed by: FAMILY MEDICINE

## 2020-04-21 PROCEDURE — 3077F SYST BP >= 140 MM HG: CPT | Mod: S$GLB,,, | Performed by: FAMILY MEDICINE

## 2020-04-21 PROCEDURE — 99213 OFFICE O/P EST LOW 20 MIN: CPT | Mod: S$GLB,,, | Performed by: FAMILY MEDICINE

## 2020-04-21 PROCEDURE — 3008F PR BODY MASS INDEX (BMI) DOCUMENTED: ICD-10-PCS | Mod: S$GLB,,, | Performed by: FAMILY MEDICINE

## 2020-04-21 PROCEDURE — 3079F DIAST BP 80-89 MM HG: CPT | Mod: S$GLB,,, | Performed by: FAMILY MEDICINE

## 2020-04-21 PROCEDURE — 3077F PR MOST RECENT SYSTOLIC BLOOD PRESSURE >= 140 MM HG: ICD-10-PCS | Mod: S$GLB,,, | Performed by: FAMILY MEDICINE

## 2020-04-21 PROCEDURE — 3008F BODY MASS INDEX DOCD: CPT | Mod: S$GLB,,, | Performed by: FAMILY MEDICINE

## 2020-04-21 PROCEDURE — 3079F PR MOST RECENT DIASTOLIC BLOOD PRESSURE 80-89 MM HG: ICD-10-PCS | Mod: S$GLB,,, | Performed by: FAMILY MEDICINE

## 2020-04-21 RX ORDER — AZELASTINE 1 MG/ML
1 SPRAY, METERED NASAL 2 TIMES DAILY
Qty: 30 ML | Refills: 0 | Status: SHIPPED | OUTPATIENT
Start: 2020-04-21 | End: 2020-08-04

## 2020-04-21 RX ORDER — BENZONATATE 100 MG/1
100 CAPSULE ORAL 3 TIMES DAILY PRN
Qty: 20 CAPSULE | Refills: 0 | Status: SHIPPED | OUTPATIENT
Start: 2020-04-21 | End: 2020-05-01

## 2020-04-21 RX ORDER — MAGNESIUM 200 MG
TABLET ORAL
COMMUNITY

## 2020-04-21 RX ORDER — HYDROCODONE POLISTIREX AND CHLORPHENIRAMINE POLISTIREX 10; 8 MG/5ML; MG/5ML
5 SUSPENSION, EXTENDED RELEASE ORAL EVERY 12 HOURS PRN
Qty: 115 ML | Refills: 0 | Status: SHIPPED | OUTPATIENT
Start: 2020-04-21 | End: 2021-11-23

## 2020-04-21 NOTE — LETTER
April 21, 2020      [facility]  [address]  [facility] [address]  Phone: [phone]  Fax: [phone]       Patient: Sushil Lucas   YOB: 1955  Date of Visit: 04/21/2020    To Whom It May Concern:    Sera Lucas  was at [facility] on 21Apr2020 . He may return to work/school on 27Apr2020 with no restrictions. If you have any questions or concerns, or if I can be of further assistance, please do not hesitate to contact me.    Sincerely,    Edgar Kam MD

## 2020-04-21 NOTE — PROGRESS NOTES
SUBJECTIVE:    Patient ID: Sushil Lucas is a 64 y.o. male.    Chief Complaint: URI (x 2 days)    65 yo male presents to clinic complaining of a cough and sinus issues that has worsened over the past 3 days, thomas has chronic sinus issues and has been prescribed Flonase and singlare in the past.  Pt denies any systemic symptoms, no fevers chills, no subjective fevers no myalgias, no diarrhea, No shortness of breath, no chest pressure.    URI    This is a new problem. The current episode started in the past 7 days. The problem has been unchanged. There has been no fever. Associated symptoms include coughing and rhinorrhea. Pertinent negatives include no abdominal pain, chest pain, congestion, diarrhea, dysuria, ear pain, headaches, joint pain, joint swelling, nausea, neck pain, plugged ear sensation, rash, sinus pain, sneezing, sore throat, swollen glands, vomiting or wheezing. He has tried nothing for the symptoms. The treatment provided no relief.         Past Medical History:   Diagnosis Date    Diabetes mellitus, type 2     Hyperlipidemia      Social History     Socioeconomic History    Marital status:      Spouse name: Not on file    Number of children: Not on file    Years of education: Not on file    Highest education level: Not on file   Occupational History    Not on file   Social Needs    Financial resource strain: Not on file    Food insecurity:     Worry: Not on file     Inability: Not on file    Transportation needs:     Medical: Not on file     Non-medical: Not on file   Tobacco Use    Smoking status: Never Smoker    Smokeless tobacco: Never Used   Substance and Sexual Activity    Alcohol use: No     Comment: 1-2 times per year    Drug use: No    Sexual activity: Not on file   Lifestyle    Physical activity:     Days per week: Not on file     Minutes per session: Not on file    Stress: Not at all   Relationships    Social connections:     Talks on phone: Not on file     Gets  together: Not on file     Attends Yazidism service: Not on file     Active member of club or organization: Not on file     Attends meetings of clubs or organizations: Not on file     Relationship status: Not on file   Other Topics Concern    Not on file   Social History Narrative    Not on file     Past Surgical History:   Procedure Laterality Date    AV FISTULA REPAIR      HERNIA REPAIR      SPINE SURGERY  12/2006    C6/7 ACDF     Family History   Problem Relation Age of Onset    Heart disease Father      Current Outpatient Medications   Medication Sig Dispense Refill    atorvastatin (LIPITOR) 20 MG tablet TAKE 1 TABLET BY MOUTH EVERY DAY 30 tablet 0    celecoxib (CELEBREX) 100 MG capsule TAKE 1 CAPSULE BY MOUTH TWICE DAILY 60 capsule 0    loratadine 10 mg Cap loratadine 10 mg capsule   Take by oral route.      magnesium 200 mg Tab magnesium   25 mg      metFORMIN (GLUCOPHAGE) 500 MG tablet TAKE 1 TABLET BY MOUTH TWICE DAILY WITH MEALS 60 tablet 0    multivit-min-FA-lycopen-lutein (CENTRUM SILVER) 0.4-300-250 mg-mcg-mcg Tab Centrum Silver      propranolol (INDERAL) 80 MG tablet Take 1 tablet (80 mg total) by mouth 3 (three) times daily. 90 tablet 11    tiZANidine (ZANAFLEX) 4 MG tablet TAKE 1 TABLET BY MOUTH EVERY 6 HOURS AS NEEDED 60 tablet 0    azelastine (ASTELIN) 137 mcg (0.1 %) nasal spray 1 spray (137 mcg total) by Nasal route 2 (two) times daily. 30 mL 0    benzonatate (TESSALON) 100 MG capsule Take 1 capsule (100 mg total) by mouth 3 (three) times daily as needed for Cough. 20 capsule 0    fluorometholone 0.1% (FML) 0.1 % DrpS       hydrocodone-chlorpheniramine (TUSSIONEX) 10-8 mg/5 mL suspension Take 5 mLs by mouth every 12 (twelve) hours as needed. 115 mL 0    montelukast (SINGULAIR) 10 mg tablet TAKE 1 TABLET(10 MG) BY MOUTH EVERY EVENING (Patient not taking: Reported on 4/21/2020) 30 tablet 6    ranitidine (ZANTAC) 150 MG tablet Take 150 mg by mouth 2 (two) times daily.       No  "current facility-administered medications for this visit.      Review of patient's allergies indicates:  No Known Allergies    Review of Systems   Constitutional: Negative for activity change, appetite change, diaphoresis, fatigue, fever and unexpected weight change.   HENT: Positive for rhinorrhea. Negative for congestion, ear pain, sinus pain, sneezing and sore throat.    Eyes: Negative for photophobia, redness and visual disturbance.   Respiratory: Positive for cough. Negative for choking, chest tightness, shortness of breath and wheezing.    Cardiovascular: Negative for chest pain and palpitations.   Gastrointestinal: Negative for abdominal pain, diarrhea, nausea and vomiting.   Genitourinary: Negative for dysuria.   Musculoskeletal: Negative for joint pain and neck pain.   Skin: Negative for rash.   Neurological: Negative for headaches.          Blood pressure (!) 144/88, pulse 80, temperature 98 °F (36.7 °C), height 5' 8" (1.727 m), weight 94.6 kg (208 lb 8 oz), SpO2 98 %. Body mass index is 31.7 kg/m².   Objective:      Physical Exam   Constitutional: He is oriented to person, place, and time. He appears well-developed and well-nourished. No distress.   HENT:   Head: Normocephalic and atraumatic.   Right Ear: External ear normal.   Left Ear: External ear normal.   Nose: Nose normal.   Mouth/Throat: Oropharynx is clear and moist. No oropharyngeal exudate.   Eyes: Pupils are equal, round, and reactive to light. EOM are normal. No scleral icterus.   Cardiovascular: Normal rate, regular rhythm and normal heart sounds.   No murmur heard.  Pulmonary/Chest: Effort normal and breath sounds normal. No respiratory distress. He has no wheezes.   Neurological: He is alert and oriented to person, place, and time.   Skin: He is not diaphoretic.   Vitals reviewed.          Assessment:       1. Cough         Plan:           Cough  -     azelastine (ASTELIN) 137 mcg (0.1 %) nasal spray; 1 spray (137 mcg total) by Nasal route " 2 (two) times daily.  Dispense: 30 mL; Refill: 0  -     benzonatate (TESSALON) 100 MG capsule; Take 1 capsule (100 mg total) by mouth 3 (three) times daily as needed for Cough.  Dispense: 20 capsule; Refill: 0  -     hydrocodone-chlorpheniramine (TUSSIONEX) 10-8 mg/5 mL suspension; Take 5 mLs by mouth every 12 (twelve) hours as needed.  Dispense: 115 mL; Refill: 0      Pt currently only with cough and no other systemic symptoms of (covid-19) will treat as allergic rhinitis but I have asked him to remain at home to insure that his symptoms are not worsening. Instructed him to follow up for testing if he begins to feel worse (fever, SOB, chest pressure) we reviewed the Covid-19 symptoms on the CDC web site.

## 2020-04-27 DIAGNOSIS — E78.49 OTHER HYPERLIPIDEMIA: ICD-10-CM

## 2020-04-28 RX ORDER — ATORVASTATIN CALCIUM 20 MG/1
TABLET, FILM COATED ORAL
Qty: 30 TABLET | Refills: 0 | Status: SHIPPED | OUTPATIENT
Start: 2020-04-28 | End: 2020-06-02

## 2020-05-04 DIAGNOSIS — E11.9 TYPE 2 DIABETES MELLITUS WITHOUT COMPLICATION, WITHOUT LONG-TERM CURRENT USE OF INSULIN: ICD-10-CM

## 2020-05-04 DIAGNOSIS — M19.90 ARTHRITIS: ICD-10-CM

## 2020-05-05 RX ORDER — CELECOXIB 100 MG/1
CAPSULE ORAL
Qty: 60 CAPSULE | Refills: 0 | Status: SHIPPED | OUTPATIENT
Start: 2020-05-05 | End: 2020-06-24

## 2020-05-05 RX ORDER — METFORMIN HYDROCHLORIDE 500 MG/1
TABLET ORAL
Qty: 60 TABLET | Refills: 0 | Status: SHIPPED | OUTPATIENT
Start: 2020-05-05 | End: 2020-06-24

## 2020-06-07 ENCOUNTER — NURSE TRIAGE (OUTPATIENT)
Dept: ADMINISTRATIVE | Facility: CLINIC | Age: 65
End: 2020-06-07

## 2020-06-09 ENCOUNTER — TELEPHONE (OUTPATIENT)
Dept: FAMILY MEDICINE | Facility: CLINIC | Age: 65
End: 2020-06-09

## 2020-06-15 ENCOUNTER — TELEPHONE (OUTPATIENT)
Dept: FAMILY MEDICINE | Facility: CLINIC | Age: 65
End: 2020-06-15

## 2020-06-15 NOTE — TELEPHONE ENCOUNTER
Left message for patient to return phone call to the office. Stated in the message that it was to schedule a routine follow up.

## 2020-06-24 ENCOUNTER — OFFICE VISIT (OUTPATIENT)
Dept: PRIMARY CARE CLINIC | Facility: CLINIC | Age: 65
End: 2020-06-24
Payer: COMMERCIAL

## 2020-06-24 VITALS
DIASTOLIC BLOOD PRESSURE: 96 MMHG | TEMPERATURE: 98 F | SYSTOLIC BLOOD PRESSURE: 189 MMHG | HEART RATE: 63 BPM | RESPIRATION RATE: 20 BRPM | OXYGEN SATURATION: 98 %

## 2020-06-24 DIAGNOSIS — I10 HYPERTENSION, UNSPECIFIED TYPE: ICD-10-CM

## 2020-06-24 DIAGNOSIS — U07.1 COVID-19: Primary | ICD-10-CM

## 2020-06-24 DIAGNOSIS — Z03.818 ENCOUNTER FOR OBSERVATION FOR SUSPECTED EXPOSURE TO OTHER BIOLOGICAL AGENTS RULED OUT: ICD-10-CM

## 2020-06-24 PROCEDURE — 3080F DIAST BP >= 90 MM HG: CPT | Mod: CPTII,S$GLB,, | Performed by: PHYSICIAN ASSISTANT

## 2020-06-24 PROCEDURE — 3077F SYST BP >= 140 MM HG: CPT | Mod: CPTII,S$GLB,, | Performed by: PHYSICIAN ASSISTANT

## 2020-06-24 PROCEDURE — 99213 PR OFFICE/OUTPT VISIT, EST, LEVL III, 20-29 MIN: ICD-10-PCS | Mod: S$GLB,,, | Performed by: PHYSICIAN ASSISTANT

## 2020-06-24 PROCEDURE — U0003 INFECTIOUS AGENT DETECTION BY NUCLEIC ACID (DNA OR RNA); SEVERE ACUTE RESPIRATORY SYNDROME CORONAVIRUS 2 (SARS-COV-2) (CORONAVIRUS DISEASE [COVID-19]), AMPLIFIED PROBE TECHNIQUE, MAKING USE OF HIGH THROUGHPUT TECHNOLOGIES AS DESCRIBED BY CMS-2020-01-R: HCPCS

## 2020-06-24 PROCEDURE — 3077F PR MOST RECENT SYSTOLIC BLOOD PRESSURE >= 140 MM HG: ICD-10-PCS | Mod: CPTII,S$GLB,, | Performed by: PHYSICIAN ASSISTANT

## 2020-06-24 PROCEDURE — 99213 OFFICE O/P EST LOW 20 MIN: CPT | Mod: S$GLB,,, | Performed by: PHYSICIAN ASSISTANT

## 2020-06-24 PROCEDURE — 3080F PR MOST RECENT DIASTOLIC BLOOD PRESSURE >= 90 MM HG: ICD-10-PCS | Mod: CPTII,S$GLB,, | Performed by: PHYSICIAN ASSISTANT

## 2020-06-24 NOTE — PROGRESS NOTES
Subjective:        Time seen by provider: 8:17 AM on 06/24/2020    Sushil Lucas is a 64 y.o. male with DM who presents for an evaluation of possible COVID-19. He is asymptomatic but requires a negative test prior to returning to work after being positive ~2.5 weeks ago on 6/7. The patient states he endorsed symptoms when he was tested initially and reports his symptoms resolved 2-3 days after being test. No pertinent PSHx.     Review of Systems   Constitutional: Negative for activity change, appetite change, fatigue and fever.   HENT: Negative for congestion, rhinorrhea and sore throat.    Respiratory: Negative for cough, chest tightness, shortness of breath and wheezing.    Cardiovascular: Negative for chest pain and palpitations.   Gastrointestinal: Negative for diarrhea, nausea and vomiting.   Musculoskeletal: Negative for arthralgias and myalgias.   Skin: Negative for rash.   Neurological: Negative for weakness, light-headedness and headaches.       Objective:      Physical Exam  Vitals signs and nursing note reviewed.   Constitutional:       General: He is not in acute distress.     Appearance: He is well-developed. He is not diaphoretic.   HENT:      Head: Normocephalic and atraumatic.      Nose: Nose normal.   Eyes:      Conjunctiva/sclera: Conjunctivae normal.   Neck:      Musculoskeletal: Normal range of motion.   Cardiovascular:      Rate and Rhythm: Normal rate and regular rhythm.      Heart sounds: Normal heart sounds. No murmur.   Pulmonary:      Effort: Pulmonary effort is normal. No respiratory distress.      Breath sounds: Normal breath sounds. No wheezing.   Musculoskeletal: Normal range of motion.   Skin:     General: Skin is warm and dry.   Neurological:      Mental Status: He is alert and oriented to person, place, and time.         Assessment and Plan:      Diagnoses and all orders for this visit:    COVID-19  -     COVID-19 Routine Screening Employer Testing  - Discharge home and await  results.   - Return to clinic or ED for new or worsening symptoms.   - Follow-up with PCP as needed.     Hypertension, unspecified type  - Your blood pressure is elevated. Take your blood pressure daily and follow up with your primary care provider. For worsening symptoms, chest pain, shortness of breath, increased abdominal pain, high-grade fever, stroke, or stroke like symptoms, immediately go to the nearest Emergency Room or call 911 as soon as possible.      Scribe Attestation:   I, Millicent Larkin, am scribing for, and in the presence of, Karin Donovan PA-C. I performed the above scribed service and the documentation accurately describes the services I performed. I attest to the accuracy of the note.    I, Karin Donovan PA-C, personally performed the services described in this documentation. All medical record entries made by the scribe were at my direction and in my presence.  I have reviewed the chart and agree that the record reflects my personal performance and is accurate and complete. Karin Donovan PA-C.  2:28 PM 06/24/2020

## 2020-06-25 ENCOUNTER — OCCUPATIONAL HEALTH (OUTPATIENT)
Dept: URGENT CARE | Facility: CLINIC | Age: 65
End: 2020-06-25

## 2020-06-25 DIAGNOSIS — Z02.1 PRE-EMPLOYMENT EXAMINATION: Primary | ICD-10-CM

## 2020-06-25 PROCEDURE — 99499 UNLISTED E&M SERVICE: CPT | Mod: S$GLB,,, | Performed by: PHYSICIAN ASSISTANT

## 2020-06-25 PROCEDURE — 99499 RETURN TO WORK EXAM: BASIC: ICD-10-PCS | Mod: S$GLB,,, | Performed by: PHYSICIAN ASSISTANT

## 2020-06-25 RX ORDER — FLUTICASONE PROPIONATE 50 MCG
SPRAY, SUSPENSION (ML) NASAL
COMMUNITY
Start: 2020-06-07 | End: 2021-05-25 | Stop reason: SDUPTHER

## 2020-06-25 RX ORDER — FAMOTIDINE 40 MG/1
TABLET, FILM COATED ORAL
COMMUNITY
Start: 2020-06-07

## 2020-06-25 RX ORDER — HYDROXYCHLOROQUINE SULFATE 200 MG/1
TABLET, FILM COATED ORAL
COMMUNITY
Start: 2020-06-07 | End: 2020-08-05

## 2020-06-25 RX ORDER — AZITHROMYCIN 250 MG/1
TABLET, FILM COATED ORAL
COMMUNITY
Start: 2020-06-07 | End: 2021-11-23

## 2020-06-26 LAB — SARS-COV-2 RNA RESP QL NAA+PROBE: NOT DETECTED

## 2020-07-10 ENCOUNTER — TELEPHONE (OUTPATIENT)
Dept: FAMILY MEDICINE | Facility: CLINIC | Age: 65
End: 2020-07-10

## 2020-07-24 ENCOUNTER — TELEPHONE (OUTPATIENT)
Dept: FAMILY MEDICINE | Facility: CLINIC | Age: 65
End: 2020-07-24

## 2020-07-24 NOTE — TELEPHONE ENCOUNTER
----- Message from Meagan Warner sent at 7/23/2020  1:19 PM CDT -----  Regarding: patient due for visit  Patient overdue for DM/HTN visit. Attempted once earlier this month to schedule fu appt. Please outreach pt to schedule a visit with Eddy or Stacey. Thank you

## 2020-08-03 DIAGNOSIS — J01.10 ACUTE NON-RECURRENT FRONTAL SINUSITIS: ICD-10-CM

## 2020-08-03 DIAGNOSIS — M19.90 ARTHRITIS: ICD-10-CM

## 2020-08-03 DIAGNOSIS — E78.49 OTHER HYPERLIPIDEMIA: ICD-10-CM

## 2020-08-03 DIAGNOSIS — E11.9 TYPE 2 DIABETES MELLITUS WITHOUT COMPLICATION, WITHOUT LONG-TERM CURRENT USE OF INSULIN: ICD-10-CM

## 2020-08-04 NOTE — TELEPHONE ENCOUNTER
Please call Sushil and ask him if he is taking hydroxychloroquine. When I try and order his requested medications there is a drug interaction.

## 2020-08-06 RX ORDER — PROPRANOLOL HYDROCHLORIDE 80 MG/1
TABLET ORAL
Qty: 90 TABLET | Refills: 11 | Status: SHIPPED | OUTPATIENT
Start: 2020-08-06 | End: 2021-09-02

## 2020-08-06 RX ORDER — CELECOXIB 100 MG/1
CAPSULE ORAL
Qty: 60 CAPSULE | Refills: 11 | Status: SHIPPED | OUTPATIENT
Start: 2020-08-06 | End: 2021-08-18

## 2020-08-06 RX ORDER — MONTELUKAST SODIUM 10 MG/1
TABLET ORAL
Qty: 30 TABLET | Refills: 11 | Status: SHIPPED | OUTPATIENT
Start: 2020-08-06 | End: 2021-08-26

## 2020-08-06 RX ORDER — TIZANIDINE 4 MG/1
4 TABLET ORAL EVERY 6 HOURS PRN
Qty: 60 TABLET | Refills: 3 | Status: SHIPPED | OUTPATIENT
Start: 2020-08-06 | End: 2023-03-23

## 2020-08-06 RX ORDER — METFORMIN HYDROCHLORIDE 500 MG/1
TABLET ORAL
Qty: 60 TABLET | Refills: 11 | Status: SHIPPED | OUTPATIENT
Start: 2020-08-06 | End: 2021-08-18

## 2020-08-06 RX ORDER — ATORVASTATIN CALCIUM 20 MG/1
20 TABLET, FILM COATED ORAL DAILY
Qty: 30 TABLET | Refills: 11 | Status: SHIPPED | OUTPATIENT
Start: 2020-08-06 | End: 2021-08-26

## 2020-10-08 ENCOUNTER — TELEPHONE (OUTPATIENT)
Dept: FAMILY MEDICINE | Facility: CLINIC | Age: 65
End: 2020-10-08

## 2020-10-08 DIAGNOSIS — E11.9 TYPE 2 DIABETES MELLITUS WITHOUT COMPLICATION, WITHOUT LONG-TERM CURRENT USE OF INSULIN: Primary | ICD-10-CM

## 2020-10-08 NOTE — TELEPHONE ENCOUNTER
Patient states that he will get the A1c done. He will have to call at a later date to schedule a follow up appointment to go over the results. I have pended the order. Please sign

## 2020-10-13 ENCOUNTER — TELEPHONE (OUTPATIENT)
Dept: FAMILY MEDICINE | Facility: CLINIC | Age: 65
End: 2020-10-13

## 2020-12-28 ENCOUNTER — TELEPHONE (OUTPATIENT)
Dept: FAMILY MEDICINE | Facility: CLINIC | Age: 65
End: 2020-12-28

## 2021-04-01 ENCOUNTER — TELEPHONE (OUTPATIENT)
Dept: FAMILY MEDICINE | Facility: CLINIC | Age: 66
End: 2021-04-01

## 2021-04-29 ENCOUNTER — PATIENT MESSAGE (OUTPATIENT)
Dept: RESEARCH | Facility: HOSPITAL | Age: 66
End: 2021-04-29

## 2021-05-26 RX ORDER — FLUTICASONE PROPIONATE 50 MCG
1 SPRAY, SUSPENSION (ML) NASAL DAILY PRN
Qty: 15.8 ML | Refills: 2 | Status: SHIPPED | OUTPATIENT
Start: 2021-05-26 | End: 2021-11-23

## 2021-08-27 ENCOUNTER — PATIENT MESSAGE (OUTPATIENT)
Dept: FAMILY MEDICINE | Facility: CLINIC | Age: 66
End: 2021-08-27

## 2021-09-02 DIAGNOSIS — E11.9 TYPE 2 DIABETES MELLITUS WITHOUT COMPLICATION, WITHOUT LONG-TERM CURRENT USE OF INSULIN: Primary | ICD-10-CM

## 2021-09-02 DIAGNOSIS — E78.49 OTHER HYPERLIPIDEMIA: ICD-10-CM

## 2021-09-02 RX ORDER — PROPRANOLOL HYDROCHLORIDE 80 MG/1
TABLET ORAL
Qty: 90 TABLET | Refills: 11 | Status: SHIPPED | OUTPATIENT
Start: 2021-09-02 | End: 2023-03-23

## 2021-11-23 ENCOUNTER — OFFICE VISIT (OUTPATIENT)
Dept: FAMILY MEDICINE | Facility: CLINIC | Age: 66
End: 2021-11-23
Payer: COMMERCIAL

## 2021-11-23 VITALS
SYSTOLIC BLOOD PRESSURE: 148 MMHG | HEIGHT: 68 IN | WEIGHT: 210.81 LBS | DIASTOLIC BLOOD PRESSURE: 82 MMHG | OXYGEN SATURATION: 97 % | BODY MASS INDEX: 31.95 KG/M2 | RESPIRATION RATE: 20 BRPM | HEART RATE: 70 BPM | TEMPERATURE: 98 F

## 2021-11-23 DIAGNOSIS — J40 BRONCHITIS: Primary | ICD-10-CM

## 2021-11-23 DIAGNOSIS — R09.89 SYMPTOMS OF UPPER RESPIRATORY INFECTION (URI): ICD-10-CM

## 2021-11-23 DIAGNOSIS — R05.9 COUGH: ICD-10-CM

## 2021-11-23 LAB
CTP QC/QA: YES
CTP QC/QA: YES
FLUAV AG NPH QL: NEGATIVE
FLUBV AG NPH QL: NEGATIVE
SARS-COV-2 RDRP RESP QL NAA+PROBE: NEGATIVE

## 2021-11-23 PROCEDURE — 99213 PR OFFICE/OUTPT VISIT, EST, LEVL III, 20-29 MIN: ICD-10-PCS | Mod: 25,S$GLB,, | Performed by: NURSE PRACTITIONER

## 2021-11-23 PROCEDURE — 99213 OFFICE O/P EST LOW 20 MIN: CPT | Mod: 25,S$GLB,, | Performed by: NURSE PRACTITIONER

## 2021-11-23 PROCEDURE — 87804 POCT INFLUENZA A/B: ICD-10-PCS | Mod: QW,S$GLB,, | Performed by: NURSE PRACTITIONER

## 2021-11-23 PROCEDURE — 87804 INFLUENZA ASSAY W/OPTIC: CPT | Mod: QW,S$GLB,, | Performed by: NURSE PRACTITIONER

## 2021-11-23 PROCEDURE — U0002 COVID-19 LAB TEST NON-CDC: HCPCS | Mod: QW,S$GLB,, | Performed by: NURSE PRACTITIONER

## 2021-11-23 PROCEDURE — U0002: ICD-10-PCS | Mod: QW,S$GLB,, | Performed by: NURSE PRACTITIONER

## 2021-11-23 RX ORDER — LEVOCETIRIZINE DIHYDROCHLORIDE 5 MG/1
5 TABLET, FILM COATED ORAL NIGHTLY
Qty: 90 TABLET | Refills: 0 | Status: SHIPPED | OUTPATIENT
Start: 2021-11-23 | End: 2023-03-23

## 2021-11-23 RX ORDER — FLUTICASONE PROPIONATE 50 MCG
2 SPRAY, SUSPENSION (ML) NASAL DAILY
Qty: 15.8 ML | Refills: 2 | Status: SHIPPED | OUTPATIENT
Start: 2021-11-23 | End: 2022-04-14

## 2021-11-23 RX ORDER — PROMETHAZINE HYDROCHLORIDE AND DEXTROMETHORPHAN HYDROBROMIDE 6.25; 15 MG/5ML; MG/5ML
5 SYRUP ORAL NIGHTLY PRN
Qty: 118 ML | Refills: 0 | Status: SHIPPED | OUTPATIENT
Start: 2021-11-23 | End: 2021-12-03

## 2021-11-23 RX ORDER — DEXTROMETHORPHAN HYDROBROMIDE AND GUAIFENESIN 10; 200 MG/1; MG/1
CAPSULE, GELATIN COATED ORAL
Qty: 30 EACH | Refills: 0 | Status: SHIPPED | OUTPATIENT
Start: 2021-11-23 | End: 2023-03-23

## 2021-11-23 RX ORDER — AZITHROMYCIN 250 MG/1
TABLET, FILM COATED ORAL
Qty: 6 TABLET | Refills: 0 | Status: SHIPPED | OUTPATIENT
Start: 2021-11-23 | End: 2021-11-28

## 2021-11-23 RX ORDER — AZELASTINE 1 MG/ML
SPRAY, METERED NASAL
Qty: 30 ML | Refills: 0 | Status: SHIPPED | OUTPATIENT
Start: 2021-11-23 | End: 2022-06-01

## 2021-11-23 RX ORDER — ALBUTEROL SULFATE 90 UG/1
1-2 AEROSOL, METERED RESPIRATORY (INHALATION) EVERY 4 HOURS PRN
Qty: 18 G | Refills: 0 | Status: SHIPPED | OUTPATIENT
Start: 2021-11-23 | End: 2023-03-23

## 2022-06-01 DIAGNOSIS — E11.9 TYPE 2 DIABETES MELLITUS WITHOUT COMPLICATION, WITHOUT LONG-TERM CURRENT USE OF INSULIN: Primary | ICD-10-CM

## 2022-06-01 DIAGNOSIS — E78.49 OTHER HYPERLIPIDEMIA: ICD-10-CM

## 2022-06-01 RX ORDER — ATORVASTATIN CALCIUM 20 MG/1
TABLET, FILM COATED ORAL
Qty: 30 TABLET | Refills: 2 | Status: SHIPPED | OUTPATIENT
Start: 2022-06-01 | End: 2022-06-05

## 2022-08-31 DIAGNOSIS — E11.9 TYPE 2 DIABETES MELLITUS WITHOUT COMPLICATION, WITHOUT LONG-TERM CURRENT USE OF INSULIN: ICD-10-CM

## 2022-09-05 RX ORDER — METFORMIN HYDROCHLORIDE 500 MG/1
TABLET ORAL
Qty: 60 TABLET | Refills: 0 | OUTPATIENT
Start: 2022-09-05

## 2023-03-23 ENCOUNTER — OFFICE VISIT (OUTPATIENT)
Dept: FAMILY MEDICINE | Facility: CLINIC | Age: 68
End: 2023-03-23
Payer: COMMERCIAL

## 2023-03-23 VITALS
BODY MASS INDEX: 29.16 KG/M2 | OXYGEN SATURATION: 96 % | HEIGHT: 68 IN | SYSTOLIC BLOOD PRESSURE: 155 MMHG | HEART RATE: 78 BPM | WEIGHT: 192.38 LBS | DIASTOLIC BLOOD PRESSURE: 96 MMHG

## 2023-03-23 DIAGNOSIS — E11.9 TYPE 2 DIABETES MELLITUS WITHOUT COMPLICATION, WITHOUT LONG-TERM CURRENT USE OF INSULIN: Primary | ICD-10-CM

## 2023-03-23 DIAGNOSIS — M19.90 ARTHRITIS: ICD-10-CM

## 2023-03-23 DIAGNOSIS — E78.49 OTHER HYPERLIPIDEMIA: ICD-10-CM

## 2023-03-23 PROCEDURE — 1159F PR MEDICATION LIST DOCUMENTED IN MEDICAL RECORD: ICD-10-PCS | Mod: CPTII,S$GLB,, | Performed by: FAMILY MEDICINE

## 2023-03-23 PROCEDURE — 3008F BODY MASS INDEX DOCD: CPT | Mod: CPTII,S$GLB,, | Performed by: FAMILY MEDICINE

## 2023-03-23 PROCEDURE — 1100F PTFALLS ASSESS-DOCD GE2>/YR: CPT | Mod: CPTII,S$GLB,, | Performed by: FAMILY MEDICINE

## 2023-03-23 PROCEDURE — 1159F MED LIST DOCD IN RCRD: CPT | Mod: CPTII,S$GLB,, | Performed by: FAMILY MEDICINE

## 2023-03-23 PROCEDURE — 1125F AMNT PAIN NOTED PAIN PRSNT: CPT | Mod: CPTII,S$GLB,, | Performed by: FAMILY MEDICINE

## 2023-03-23 PROCEDURE — 99214 PR OFFICE/OUTPT VISIT, EST, LEVL IV, 30-39 MIN: ICD-10-PCS | Mod: S$GLB,,, | Performed by: FAMILY MEDICINE

## 2023-03-23 PROCEDURE — 3288F FALL RISK ASSESSMENT DOCD: CPT | Mod: CPTII,S$GLB,, | Performed by: FAMILY MEDICINE

## 2023-03-23 PROCEDURE — 1125F PR PAIN SEVERITY QUANTIFIED, PAIN PRESENT: ICD-10-PCS | Mod: CPTII,S$GLB,, | Performed by: FAMILY MEDICINE

## 2023-03-23 PROCEDURE — 3080F PR MOST RECENT DIASTOLIC BLOOD PRESSURE >= 90 MM HG: ICD-10-PCS | Mod: CPTII,S$GLB,, | Performed by: FAMILY MEDICINE

## 2023-03-23 PROCEDURE — 3077F PR MOST RECENT SYSTOLIC BLOOD PRESSURE >= 140 MM HG: ICD-10-PCS | Mod: CPTII,S$GLB,, | Performed by: FAMILY MEDICINE

## 2023-03-23 PROCEDURE — 3288F PR FALLS RISK ASSESSMENT DOCUMENTED: ICD-10-PCS | Mod: CPTII,S$GLB,, | Performed by: FAMILY MEDICINE

## 2023-03-23 PROCEDURE — 1100F PR PT FALLS ASSESS DOC 2+ FALLS/FALL W/INJURY/YR: ICD-10-PCS | Mod: CPTII,S$GLB,, | Performed by: FAMILY MEDICINE

## 2023-03-23 PROCEDURE — 99214 OFFICE O/P EST MOD 30 MIN: CPT | Mod: S$GLB,,, | Performed by: FAMILY MEDICINE

## 2023-03-23 PROCEDURE — 3080F DIAST BP >= 90 MM HG: CPT | Mod: CPTII,S$GLB,, | Performed by: FAMILY MEDICINE

## 2023-03-23 PROCEDURE — 3077F SYST BP >= 140 MM HG: CPT | Mod: CPTII,S$GLB,, | Performed by: FAMILY MEDICINE

## 2023-03-23 PROCEDURE — 3008F PR BODY MASS INDEX (BMI) DOCUMENTED: ICD-10-PCS | Mod: CPTII,S$GLB,, | Performed by: FAMILY MEDICINE

## 2023-03-23 RX ORDER — DICLOFENAC SODIUM 10 MG/G
2 GEL TOPICAL DAILY
Qty: 50 G | Refills: 3 | Status: SHIPPED | OUTPATIENT
Start: 2023-03-23

## 2023-03-23 NOTE — PROGRESS NOTES
SUBJECTIVE:    Patient ID: Sushil Lucas is a 67 y.o. male.    Chief Complaint: Annual Exam  67-year-old male last seen by me in 2020, patient has a history of chronic arthritis of bilateral knees, hyperlipidemia, diabetes, hypertension his blood pressure is not well controlled today.  Patient is not taking his medications in several years.  Patient states he does not want to start on any medications.  Blood pressure today 155/96, he denies any chest pain shortness of breath or dyspnea on exertion.  He does have significant knee arthritis he has been on Celebrex in the past but he does not want to return to taking Celebrex Mobic or any anti-inflammatories.    Patient has never had an A1c on file since 2019, will attempt to get 1 after this visit.    Significant past medical history  Arthritis: Celebrex   Hyperlipidemia atorvastatin 20 mg   Diabetes type 2 metformin 500 mg  Hypertension propanolol 80 mg    Specialists    Smoke:  None  ETOH:  Exercise:      Diabetes  He presents for his follow-up diabetic visit. He has type 2 diabetes mellitus. Disease course: Unknown. There are no hypoglycemic associated symptoms. Pertinent negatives for hypoglycemia include no confusion, headaches or sweats. Pertinent negatives for diabetes include no blurred vision, no chest pain, no fatigue, no polydipsia, no polyuria and no weakness. There are no hypoglycemic complications. When asked about current treatments, none were reported.   Hypertension  This is a chronic problem. The current episode started more than 1 year ago. The problem is unchanged. The problem is uncontrolled. Pertinent negatives include no anxiety, blurred vision, chest pain, headaches, malaise/fatigue, neck pain, orthopnea, palpitations, peripheral edema, PND, shortness of breath or sweats. There are no associated agents to hypertension. Risk factors for coronary artery disease include diabetes mellitus, dyslipidemia, male gender and sedentary lifestyle.  Past treatments include nothing.       Past Medical History:   Diagnosis Date    Diabetes mellitus, type 2     Hyperlipidemia      Social History     Socioeconomic History    Marital status:    Tobacco Use    Smoking status: Never    Smokeless tobacco: Never   Substance and Sexual Activity    Alcohol use: No     Comment: 1-2 times per year    Drug use: No     Past Surgical History:   Procedure Laterality Date    AV FISTULA REPAIR      HERNIA REPAIR      SPINE SURGERY  12/2006    C6/7 ACDF     Family History   Problem Relation Age of Onset    Heart disease Father      Current Outpatient Medications   Medication Sig Dispense Refill    diclofenac sodium (VOLTAREN) 1 % Gel Apply 2 g topically once daily. 50 g 3    famotidine (PEPCID) 40 MG tablet       fluorometholone 0.1% (FML) 0.1 % DrpS       magnesium 200 mg Tab magnesium   25 mg      multivit-min-FA-lycopen-lutein 0.4-300-250 mg-mcg-mcg Tab Centrum Silver       No current facility-administered medications for this visit.     Review of patient's allergies indicates:  No Known Allergies    Review of Systems   Constitutional:  Negative for activity change, diaphoresis, fatigue, malaise/fatigue and unexpected weight change.   HENT:  Positive for rhinorrhea. Negative for congestion, hearing loss, sinus pressure, sinus pain and trouble swallowing.    Eyes:  Negative for blurred vision, discharge and visual disturbance.   Respiratory:  Negative for cough, chest tightness, shortness of breath and wheezing.    Cardiovascular:  Negative for chest pain, palpitations, orthopnea and PND.   Gastrointestinal:  Negative for abdominal distention, abdominal pain, anal bleeding, blood in stool, constipation, diarrhea and vomiting.   Endocrine: Negative for polydipsia and polyuria.   Genitourinary:  Negative for difficulty urinating, hematuria and urgency.   Musculoskeletal:  Positive for arthralgias and joint swelling. Negative for neck pain.   Neurological:  Negative for  "weakness and headaches.   Psychiatric/Behavioral:  Negative for confusion and dysphoric mood.         Blood pressure (!) 155/96, pulse 78, height 5' 8" (1.727 m), weight 87.3 kg (192 lb 6.4 oz), SpO2 96 %. Body mass index is 29.25 kg/m².   Objective:      Physical Exam  Vitals reviewed.   Constitutional:       General: He is not in acute distress.     Appearance: Normal appearance. He is not ill-appearing or toxic-appearing.   HENT:      Head: Normocephalic and atraumatic.      Nose: Nose normal. No congestion or rhinorrhea.   Cardiovascular:      Rate and Rhythm: Normal rate and regular rhythm.      Heart sounds: Normal heart sounds. No murmur heard.  Pulmonary:      Effort: Pulmonary effort is normal. No respiratory distress.      Breath sounds: Normal breath sounds. No wheezing.   Musculoskeletal:      Right knee: Bony tenderness present.      Left knee: Bony tenderness present.   Skin:     General: Skin is warm and dry.      Capillary Refill: Capillary refill takes less than 2 seconds.   Neurological:      Mental Status: He is alert and oriented to person, place, and time.           Assessment:       1. Type 2 diabetes mellitus without complication, without long-term current use of insulin    2. Other hyperlipidemia    3. Arthritis         Plan:           Type 2 diabetes mellitus without complication, without long-term current use of insulin  -     Hemoglobin A1C; Future; Expected date: 03/23/2023  -     Comprehensive Metabolic Panel; Future; Expected date: 03/23/2023  -     Comprehensive Metabolic Panel; Future; Expected date: 03/23/2023  -     Hemoglobin A1C; Future; Expected date: 03/23/2023    Other hyperlipidemia  -     Lipid Panel; Future; Expected date: 03/23/2023  -     Lipid Panel; Future; Expected date: 03/23/2023    Arthritis  -     diclofenac sodium (VOLTAREN) 1 % Gel; Apply 2 g topically once daily.  Dispense: 50 g; Refill: 3                          "

## 2023-03-26 LAB
ALBUMIN SERPL-MCNC: 4.1 G/DL (ref 3.8–4.8)
ALBUMIN/GLOB SERPL: 1.5 {RATIO} (ref 1.2–2.2)
ALP SERPL-CCNC: 76 IU/L (ref 44–121)
ALT SERPL-CCNC: 13 IU/L (ref 0–44)
AST SERPL-CCNC: 11 IU/L (ref 0–40)
BILIRUB SERPL-MCNC: 0.3 MG/DL (ref 0–1.2)
BUN SERPL-MCNC: 12 MG/DL (ref 8–27)
BUN/CREAT SERPL: 15 (ref 10–24)
CALCIUM SERPL-MCNC: 9.1 MG/DL (ref 8.6–10.2)
CHLORIDE SERPL-SCNC: 105 MMOL/L (ref 96–106)
CHOLEST SERPL-MCNC: 189 MG/DL (ref 100–199)
CO2 SERPL-SCNC: 24 MMOL/L (ref 20–29)
CREAT SERPL-MCNC: 0.78 MG/DL (ref 0.76–1.27)
EST. GFR  (NO RACE VARIABLE): 98 ML/MIN/1.73
GLOBULIN SER CALC-MCNC: 2.7 G/DL (ref 1.5–4.5)
GLUCOSE SERPL-MCNC: 140 MG/DL (ref 70–99)
HBA1C MFR BLD: 7.4 % (ref 4.8–5.6)
HDLC SERPL-MCNC: 46 MG/DL
LDLC SERPL CALC-MCNC: 130 MG/DL (ref 0–99)
POTASSIUM SERPL-SCNC: 4.3 MMOL/L (ref 3.5–5.2)
PROT SERPL-MCNC: 6.8 G/DL (ref 6–8.5)
SODIUM SERPL-SCNC: 141 MMOL/L (ref 134–144)
TRIGL SERPL-MCNC: 70 MG/DL (ref 0–149)
VLDLC SERPL CALC-MCNC: 13 MG/DL (ref 5–40)

## 2023-03-31 ENCOUNTER — TELEPHONE (OUTPATIENT)
Dept: FAMILY MEDICINE | Facility: CLINIC | Age: 68
End: 2023-03-31

## 2023-03-31 NOTE — TELEPHONE ENCOUNTER
Patient completed labs. He called to see if you had reviewed the labs and to see if you were calling in any medications for him.   Please advise.

## 2023-04-03 ENCOUNTER — TELEPHONE (OUTPATIENT)
Dept: FAMILY MEDICINE | Facility: CLINIC | Age: 68
End: 2023-04-03

## 2023-04-03 NOTE — TELEPHONE ENCOUNTER
Patient completed his labs. Had abnormal levels and wants to know if you are going to restart his medications. Please advise.    Last Office Visit 3/23/23  Next Office Visit 9/29/23

## 2023-04-04 RX ORDER — ROSUVASTATIN CALCIUM 10 MG/1
10 TABLET, COATED ORAL DAILY
Qty: 90 TABLET | Refills: 3 | Status: SHIPPED | OUTPATIENT
Start: 2023-04-04 | End: 2024-04-03

## 2023-04-04 RX ORDER — METFORMIN HYDROCHLORIDE 750 MG/1
750 TABLET, EXTENDED RELEASE ORAL
Qty: 30 TABLET | Refills: 11 | Status: SHIPPED | OUTPATIENT
Start: 2023-04-04 | End: 2023-09-29 | Stop reason: SDUPTHER

## 2023-04-05 NOTE — TELEPHONE ENCOUNTER
Called patient to advise that his medication was sent to the pharmacy. Patient expressed verbal understanding.

## 2023-04-05 NOTE — TELEPHONE ENCOUNTER
Called in his Diabetes medications and Cholesterol medications.    Make sure he takes the Metformin in the mornining, and the Rosuvastatin at bed time.

## 2023-09-29 ENCOUNTER — OFFICE VISIT (OUTPATIENT)
Dept: FAMILY MEDICINE | Facility: CLINIC | Age: 68
End: 2023-09-29
Payer: COMMERCIAL

## 2023-09-29 VITALS
BODY MASS INDEX: 29.55 KG/M2 | HEART RATE: 86 BPM | WEIGHT: 195 LBS | OXYGEN SATURATION: 96 % | DIASTOLIC BLOOD PRESSURE: 92 MMHG | HEIGHT: 68 IN | SYSTOLIC BLOOD PRESSURE: 135 MMHG

## 2023-09-29 DIAGNOSIS — M19.90 ARTHRITIS: ICD-10-CM

## 2023-09-29 DIAGNOSIS — E11.9 TYPE 2 DIABETES MELLITUS WITHOUT COMPLICATION, WITHOUT LONG-TERM CURRENT USE OF INSULIN: Primary | ICD-10-CM

## 2023-09-29 DIAGNOSIS — G25.0 ESSENTIAL TREMOR: ICD-10-CM

## 2023-09-29 DIAGNOSIS — E78.49 OTHER HYPERLIPIDEMIA: ICD-10-CM

## 2023-09-29 PROCEDURE — 3075F SYST BP GE 130 - 139MM HG: CPT | Mod: CPTII,S$GLB,, | Performed by: FAMILY MEDICINE

## 2023-09-29 PROCEDURE — 3288F PR FALLS RISK ASSESSMENT DOCUMENTED: ICD-10-PCS | Mod: CPTII,S$GLB,, | Performed by: FAMILY MEDICINE

## 2023-09-29 PROCEDURE — 99214 PR OFFICE/OUTPT VISIT, EST, LEVL IV, 30-39 MIN: ICD-10-PCS | Mod: S$GLB,,, | Performed by: FAMILY MEDICINE

## 2023-09-29 PROCEDURE — 3288F FALL RISK ASSESSMENT DOCD: CPT | Mod: CPTII,S$GLB,, | Performed by: FAMILY MEDICINE

## 2023-09-29 PROCEDURE — 3008F BODY MASS INDEX DOCD: CPT | Mod: CPTII,S$GLB,, | Performed by: FAMILY MEDICINE

## 2023-09-29 PROCEDURE — 3051F PR MOST RECENT HEMOGLOBIN A1C LEVEL 7.0 - < 8.0%: ICD-10-PCS | Mod: CPTII,S$GLB,, | Performed by: FAMILY MEDICINE

## 2023-09-29 PROCEDURE — 1101F PT FALLS ASSESS-DOCD LE1/YR: CPT | Mod: CPTII,S$GLB,, | Performed by: FAMILY MEDICINE

## 2023-09-29 PROCEDURE — 3075F PR MOST RECENT SYSTOLIC BLOOD PRESS GE 130-139MM HG: ICD-10-PCS | Mod: CPTII,S$GLB,, | Performed by: FAMILY MEDICINE

## 2023-09-29 PROCEDURE — 3080F DIAST BP >= 90 MM HG: CPT | Mod: CPTII,S$GLB,, | Performed by: FAMILY MEDICINE

## 2023-09-29 PROCEDURE — 3080F PR MOST RECENT DIASTOLIC BLOOD PRESSURE >= 90 MM HG: ICD-10-PCS | Mod: CPTII,S$GLB,, | Performed by: FAMILY MEDICINE

## 2023-09-29 PROCEDURE — 1125F AMNT PAIN NOTED PAIN PRSNT: CPT | Mod: CPTII,S$GLB,, | Performed by: FAMILY MEDICINE

## 2023-09-29 PROCEDURE — 1159F PR MEDICATION LIST DOCUMENTED IN MEDICAL RECORD: ICD-10-PCS | Mod: CPTII,S$GLB,, | Performed by: FAMILY MEDICINE

## 2023-09-29 PROCEDURE — 1159F MED LIST DOCD IN RCRD: CPT | Mod: CPTII,S$GLB,, | Performed by: FAMILY MEDICINE

## 2023-09-29 PROCEDURE — 3051F HG A1C>EQUAL 7.0%<8.0%: CPT | Mod: CPTII,S$GLB,, | Performed by: FAMILY MEDICINE

## 2023-09-29 PROCEDURE — 1101F PR PT FALLS ASSESS DOC 0-1 FALLS W/OUT INJ PAST YR: ICD-10-PCS | Mod: CPTII,S$GLB,, | Performed by: FAMILY MEDICINE

## 2023-09-29 PROCEDURE — 3008F PR BODY MASS INDEX (BMI) DOCUMENTED: ICD-10-PCS | Mod: CPTII,S$GLB,, | Performed by: FAMILY MEDICINE

## 2023-09-29 PROCEDURE — 99214 OFFICE O/P EST MOD 30 MIN: CPT | Mod: S$GLB,,, | Performed by: FAMILY MEDICINE

## 2023-09-29 PROCEDURE — 1125F PR PAIN SEVERITY QUANTIFIED, PAIN PRESENT: ICD-10-PCS | Mod: CPTII,S$GLB,, | Performed by: FAMILY MEDICINE

## 2023-09-29 RX ORDER — METFORMIN HYDROCHLORIDE 750 MG/1
750 TABLET, EXTENDED RELEASE ORAL
Qty: 90 TABLET | Refills: 3 | Status: SHIPPED | OUTPATIENT
Start: 2023-09-29 | End: 2024-09-28

## 2023-09-29 RX ORDER — MELOXICAM 15 MG/1
15 TABLET ORAL DAILY
Qty: 90 TABLET | Refills: 1 | Status: SHIPPED | OUTPATIENT
Start: 2023-09-29

## 2023-09-29 RX ORDER — PRIMIDONE 50 MG/1
50 TABLET ORAL NIGHTLY
Qty: 30 TABLET | Refills: 11 | Status: SHIPPED | OUTPATIENT
Start: 2023-09-29 | End: 2024-09-28

## 2023-09-29 NOTE — PROGRESS NOTES
SUBJECTIVE:    Patient ID: Sushil Lucas is a 67 y.o. male.    Chief Complaint: Hypertension (Pt declined the Influenza vaccine) and Diabetes  67-year-old male patient has a history of chronic arthritis of bilateral knees, hyperlipidemia, diabetes, hypertension his blood pressure is well controlled today.  Patient has been taking his medications. Blood pressure today 135/92, he denies any chest pain shortness of breath or dyspnea on exertion.  He does have significant knee arthritis he has been on Celebrex  without relief.        Significant past medical history  Arthritis: Celebrex   Hyperlipidemia atorvastatin 20 mg   Diabetes type 2 metformin 500 mg  Hypertension propanolol 80 mg     Specialists     Smoke:  None  ETOH:  Exercise:       Hemoglobin A1c 4.8 - 5.6 % 7.4 High      Lipids  Chol: 189  Tri  HDL: 46  LDL:130    Glucose 70 - 99 mg/dL 140 High      HPI    Diabetes  He presents for his follow-up diabetic visit. He has type 2 diabetes mellitus. Disease course: Unknown. There are no hypoglycemic associated symptoms. Pertinent negatives for hypoglycemia include no confusion, headaches or sweats. Pertinent negatives for diabetes include no blurred vision, no chest pain, no fatigue, no polydipsia, no polyuria and no weakness. There are no hypoglycemic complications. When asked about current treatments, none were reported.   Hypertension  This is a chronic problem. The current episode started more than 1 year ago. The problem is unchanged. The problem is uncontrolled. Pertinent negatives include no anxiety, blurred vision, chest pain, headaches, malaise/fatigue, neck pain, orthopnea, palpitations, peripheral edema, PND, shortness of breath or sweats. There are no associated agents to hypertension. Risk factors for coronary artery disease include diabetes mellitus, dyslipidemia, male gender and sedentary lifestyle. Past treatments include nothing.   Past Medical History:   Diagnosis Date    Diabetes  mellitus, type 2     Hyperlipidemia      Social History     Socioeconomic History    Marital status:    Tobacco Use    Smoking status: Never    Smokeless tobacco: Never   Substance and Sexual Activity    Alcohol use: No     Comment: 1-2 times per year    Drug use: No     Social Determinants of Health     Stress: No Stress Concern Present (9/16/2019)    [facility] - Occupational Stress Questionnaire     Feeling of Stress : Not at all     Past Surgical History:   Procedure Laterality Date    AV FISTULA REPAIR      HERNIA REPAIR      SPINE SURGERY  12/2006    C6/7 ACDF     Family History   Problem Relation Age of Onset    Heart disease Father      Current Outpatient Medications   Medication Sig Dispense Refill    diclofenac sodium (VOLTAREN) 1 % Gel Apply 2 g topically once daily. 50 g 3    famotidine (PEPCID) 40 MG tablet       fluorometholone 0.1% (FML) 0.1 % DrpS       magnesium 200 mg Tab magnesium   25 mg      multivit-min-FA-lycopen-lutein 0.4-300-250 mg-mcg-mcg Tab Centrum Silver      rosuvastatin (CRESTOR) 10 MG tablet Take 1 tablet (10 mg total) by mouth once daily. 90 tablet 3    meloxicam (MOBIC) 15 MG tablet Take 1 tablet (15 mg total) by mouth once daily. 90 tablet 1    metFORMIN (GLUCOPHAGE-XR) 750 MG ER 24hr tablet Take 1 tablet (750 mg total) by mouth daily with breakfast. 90 tablet 3    primidone (MYSOLINE) 50 MG Tab Take 1 tablet (50 mg total) by mouth every evening. 30 tablet 11     No current facility-administered medications for this visit.     Review of patient's allergies indicates:  No Known Allergies    Review of Systems   Constitutional:  Negative for activity change, diaphoresis, fatigue and unexpected weight change.   HENT:  Negative for congestion, hearing loss, rhinorrhea, sinus pressure, sinus pain and trouble swallowing.    Eyes:  Negative for discharge and visual disturbance.   Respiratory:  Negative for cough, chest tightness, shortness of breath  "and wheezing.    Cardiovascular:  Negative for chest pain and palpitations.   Gastrointestinal:  Negative for abdominal distention, abdominal pain, anal bleeding, blood in stool, constipation, diarrhea and vomiting.   Endocrine: Negative for polydipsia and polyuria.   Genitourinary:  Negative for difficulty urinating, hematuria and urgency.   Musculoskeletal:  Positive for arthralgias and joint swelling. Negative for neck pain.   Neurological:  Negative for weakness and headaches.   Psychiatric/Behavioral:  Negative for confusion and dysphoric mood.           Blood pressure (!) 135/92, pulse 86, height 5' 8" (1.727 m), weight 88.5 kg (195 lb), SpO2 96 %. Body mass index is 29.65 kg/m².   Objective:      Physical Exam  Vitals reviewed.   Constitutional:       General: He is not in acute distress.     Appearance: Normal appearance. He is not ill-appearing or toxic-appearing.   HENT:      Head: Normocephalic and atraumatic.      Nose: Nose normal. No congestion or rhinorrhea.   Cardiovascular:      Rate and Rhythm: Normal rate and regular rhythm.      Heart sounds: Normal heart sounds. No murmur heard.  Pulmonary:      Effort: Pulmonary effort is normal. No respiratory distress.      Breath sounds: Normal breath sounds. No wheezing.   Musculoskeletal:      Right knee: Bony tenderness present.      Left knee: Bony tenderness present.   Skin:     General: Skin is warm and dry.      Capillary Refill: Capillary refill takes less than 2 seconds.   Neurological:      Mental Status: He is alert and oriented to person, place, and time.             Assessment:       1. Type 2 diabetes mellitus without complication, without long-term current use of insulin    2. Other hyperlipidemia    3. Essential tremor    4. Arthritis         Plan:           Type 2 diabetes mellitus without complication, without long-term current use of insulin  -     Hemoglobin A1C; Future; Expected date: 09/29/2023  -     Comprehensive Metabolic Panel; " Future; Expected date: 09/29/2023  -     Microalbumin/creatinine urine ratio; Future; Expected date: 09/29/2023  -     metFORMIN (GLUCOPHAGE-XR) 750 MG ER 24hr tablet; Take 1 tablet (750 mg total) by mouth daily with breakfast.  Dispense: 90 tablet; Refill: 3    Other hyperlipidemia  -     Lipid Panel; Future; Expected date: 09/29/2023  -     Comprehensive Metabolic Panel; Future; Expected date: 09/29/2023    Essential tremor  -     primidone (MYSOLINE) 50 MG Tab; Take 1 tablet (50 mg total) by mouth every evening.  Dispense: 30 tablet; Refill: 11    Arthritis  -     meloxicam (MOBIC) 15 MG tablet; Take 1 tablet (15 mg total) by mouth once daily.  Dispense: 90 tablet; Refill: 1

## 2024-03-07 ENCOUNTER — PATIENT OUTREACH (OUTPATIENT)
Dept: ADMINISTRATIVE | Facility: HOSPITAL | Age: 69
End: 2024-03-07
Payer: COMMERCIAL

## 2024-03-07 NOTE — PROGRESS NOTES
Population Health Chart Review & Patient Outreach Details      Additional [facility] Health Notes:               Updates Requested / Reviewed:      Updated Care Coordination Note, Care Everywhere, , and Immunizations Reconciliation Completed or Queried: [facility] Topics Overdue:      [facility] Score: 5     Urine Screening  Eye Exam  Hemoglobin A1c  Foot Exam  AAA Screening    Influenza Vaccine  Pneumonia Vaccine  Tetanus Vaccine  Shingles/Zoster Vaccine  RSV Vaccine                  Health Maintenance Topic(s) Outreach Outcomes & Actions Taken:    Eye Exam - Outreach Outcomes & Actions Taken  : Pt Will Schedule with External Provider / Order Routed & Care Team Updated if Applicable    Lab(s) - Outreach Outcomes & Actions Taken  : Primary Care Follow Up Visit Scheduled

## 2024-03-28 ENCOUNTER — PATIENT MESSAGE (OUTPATIENT)
Dept: ADMINISTRATIVE | Facility: HOSPITAL | Age: 69
End: 2024-03-28
Payer: COMMERCIAL

## 2024-04-05 DIAGNOSIS — M19.90 ARTHRITIS: ICD-10-CM

## 2024-04-07 RX ORDER — MELOXICAM 15 MG/1
15 TABLET ORAL
Qty: 90 TABLET | Refills: 0 | Status: SHIPPED | OUTPATIENT
Start: 2024-04-07

## 2024-04-08 ENCOUNTER — TELEPHONE (OUTPATIENT)
Dept: FAMILY MEDICINE | Facility: CLINIC | Age: 69
End: 2024-04-08
Payer: COMMERCIAL

## 2024-05-09 ENCOUNTER — PATIENT OUTREACH (OUTPATIENT)
Dept: ADMINISTRATIVE | Facility: HOSPITAL | Age: 69
End: 2024-05-09
Payer: COMMERCIAL

## 2024-05-09 NOTE — PROGRESS NOTES
Population Health Chart Review & Patient Outreach Details      Additional [facility] Health Notes:               Updates Requested / Reviewed:      Updated Care Coordination Note, Care Everywhere, and Immunizations Reconciliation Completed or Queried: [facility] Topics Overdue:      [facility] Score: 6     Urine Screening  Eye Exam  Hemoglobin A1c  Lipid Panel  Foot Exam  AAA Screening    Pneumonia Vaccine  Tetanus Vaccine  Shingles/Zoster Vaccine  RSV Vaccine                  Health Maintenance Topic(s) Outreach Outcomes & Actions Taken:    Primary Care Appt - Outreach Outcomes & Actions Taken  : A voice message was left for the patient to schedule his annual visit.

## 2024-05-21 DIAGNOSIS — E11.9 TYPE 2 DIABETES MELLITUS WITHOUT COMPLICATION, WITHOUT LONG-TERM CURRENT USE OF INSULIN: ICD-10-CM

## 2024-05-22 RX ORDER — METFORMIN HYDROCHLORIDE 750 MG/1
750 TABLET, EXTENDED RELEASE ORAL
Qty: 90 TABLET | Refills: 3 | Status: SHIPPED | OUTPATIENT
Start: 2024-05-22

## 2024-05-27 ENCOUNTER — PATIENT MESSAGE (OUTPATIENT)
Dept: ADMINISTRATIVE | Facility: HOSPITAL | Age: 69
End: 2024-05-27
Payer: COMMERCIAL

## 2024-06-24 RX ORDER — ROSUVASTATIN CALCIUM 10 MG/1
10 TABLET, COATED ORAL
Qty: 90 TABLET | Refills: 0 | Status: SHIPPED | OUTPATIENT
Start: 2024-06-24

## 2024-07-11 DIAGNOSIS — M19.90 ARTHRITIS: ICD-10-CM

## 2024-07-14 RX ORDER — MELOXICAM 15 MG/1
15 TABLET ORAL
Qty: 90 TABLET | Refills: 0 | Status: SHIPPED | OUTPATIENT
Start: 2024-07-14

## 2024-07-26 ENCOUNTER — PATIENT MESSAGE (OUTPATIENT)
Dept: ADMINISTRATIVE | Facility: HOSPITAL | Age: 69
End: 2024-07-26
Payer: COMMERCIAL

## 2024-08-26 ENCOUNTER — PATIENT MESSAGE (OUTPATIENT)
Dept: ADMINISTRATIVE | Facility: HOSPITAL | Age: 69
End: 2024-08-26
Payer: COMMERCIAL

## 2024-08-29 ENCOUNTER — TELEPHONE (OUTPATIENT)
Dept: FAMILY MEDICINE | Facility: CLINIC | Age: 69
End: 2024-08-29
Payer: COMMERCIAL

## 2024-08-29 NOTE — TELEPHONE ENCOUNTER
Returned patients call regarding labs. Advised patient's wife  he had labs and they were fasting. Verbal understanding was expressed.

## 2024-08-29 NOTE — TELEPHONE ENCOUNTER
----- Message from Mary Lanza sent at 8/29/2024  8:14 AM CDT -----  Contact: Radha Beebe wants to speak with the nurse concerning labs.  Radha @ [phone]

## 2024-09-03 ENCOUNTER — PATIENT MESSAGE (OUTPATIENT)
Dept: ADMINISTRATIVE | Facility: HOSPITAL | Age: 69
End: 2024-09-03
Payer: COMMERCIAL

## 2024-09-05 DIAGNOSIS — G25.0 ESSENTIAL TREMOR: ICD-10-CM

## 2024-09-05 RX ORDER — PRIMIDONE 50 MG/1
50 TABLET ORAL NIGHTLY
Qty: 30 TABLET | Refills: 0 | Status: SHIPPED | OUTPATIENT
Start: 2024-09-05

## 2024-09-06 ENCOUNTER — PATIENT OUTREACH (OUTPATIENT)
Dept: ADMINISTRATIVE | Facility: HOSPITAL | Age: 69
End: 2024-09-06
Payer: COMMERCIAL

## 2024-09-06 NOTE — PROGRESS NOTES
Population Health Chart Review & Patient Outreach Details      Additional [facility] Health Notes:               Updates Requested / Reviewed:      Updated Care Coordination Note, Care Everywhere, , and Immunizations Reconciliation Completed or Queried: [facility] Topics Overdue:      [facility] Score: 6     Urine Screening  Eye Exam  Hemoglobin A1c  Lipid Panel  Foot Exam  AAA Screening    Influenza Vaccine  Pneumonia Vaccine  Tetanus Vaccine  Shingles/Zoster Vaccine  RSV Vaccine                  Health Maintenance Topic(s) Outreach Outcomes & Actions Taken:    Lab(s) - Outreach Outcomes & Actions Taken  : Primary Care Follow Up Visit Scheduled

## 2024-09-26 ENCOUNTER — TELEPHONE (OUTPATIENT)
Dept: FAMILY MEDICINE | Facility: CLINIC | Age: 69
End: 2024-09-26
Payer: COMMERCIAL

## 2024-09-26 NOTE — TELEPHONE ENCOUNTER
Spoke to patient about getting labs done before appointment next week. Patient stated he was going Saturday.

## 2024-09-28 ENCOUNTER — LAB VISIT (OUTPATIENT)
Dept: LAB | Facility: HOSPITAL | Age: 69
End: 2024-09-28
Attending: FAMILY MEDICINE
Payer: COMMERCIAL

## 2024-09-28 DIAGNOSIS — E78.49 OTHER HYPERLIPIDEMIA: ICD-10-CM

## 2024-09-28 DIAGNOSIS — E11.9 TYPE 2 DIABETES MELLITUS WITHOUT COMPLICATION, WITHOUT LONG-TERM CURRENT USE OF INSULIN: ICD-10-CM

## 2024-09-28 LAB
ALBUMIN SERPL BCP-MCNC: 4.1 G/DL (ref 3.5–5.2)
ALBUMIN/CREAT UR: 158.1 UG/MG (ref 0–30)
ALP SERPL-CCNC: 65 U/L (ref 55–135)
ALT SERPL W/O P-5'-P-CCNC: 22 U/L (ref 10–44)
ANION GAP SERPL CALC-SCNC: 5 MMOL/L (ref 8–16)
AST SERPL-CCNC: 14 U/L (ref 10–40)
BILIRUB SERPL-MCNC: 0.5 MG/DL (ref 0.1–1)
BUN SERPL-MCNC: 17 MG/DL (ref 8–23)
CALCIUM SERPL-MCNC: 9 MG/DL (ref 8.7–10.5)
CHLORIDE SERPL-SCNC: 103 MMOL/L (ref 95–110)
CHOLEST SERPL-MCNC: 121 MG/DL (ref 120–199)
CHOLEST/HDLC SERPL: 2.7 {RATIO} (ref 2–5)
CO2 SERPL-SCNC: 30 MMOL/L (ref 23–29)
CREAT SERPL-MCNC: 0.9 MG/DL (ref 0.5–1.4)
CREAT UR-MCNC: 105.6 MG/DL (ref 23–375)
EST. GFR  (NO RACE VARIABLE): >60 ML/MIN/1.73 M^2
ESTIMATED AVG GLUCOSE: 240 MG/DL (ref 68–131)
GLUCOSE SERPL-MCNC: 247 MG/DL (ref 70–110)
HBA1C MFR BLD: 10 % (ref 4.5–6.2)
HDLC SERPL-MCNC: 45 MG/DL (ref 40–75)
HDLC SERPL: 37.2 % (ref 20–50)
LDLC SERPL CALC-MCNC: 58.2 MG/DL (ref 63–159)
MICROALBUMIN UR DL<=1MG/L-MCNC: 167 UG/ML
NONHDLC SERPL-MCNC: 76 MG/DL
POTASSIUM SERPL-SCNC: 4.3 MMOL/L (ref 3.5–5.1)
PROT SERPL-MCNC: 6.9 G/DL (ref 6–8.4)
SODIUM SERPL-SCNC: 138 MMOL/L (ref 136–145)
TRIGL SERPL-MCNC: 89 MG/DL (ref 30–150)

## 2024-09-28 PROCEDURE — 80061 LIPID PANEL: CPT | Performed by: FAMILY MEDICINE

## 2024-09-28 PROCEDURE — 80053 COMPREHEN METABOLIC PANEL: CPT | Performed by: FAMILY MEDICINE

## 2024-09-28 PROCEDURE — 82043 UR ALBUMIN QUANTITATIVE: CPT | Performed by: FAMILY MEDICINE

## 2024-09-28 PROCEDURE — 83036 HEMOGLOBIN GLYCOSYLATED A1C: CPT | Performed by: FAMILY MEDICINE

## 2024-09-28 PROCEDURE — 82570 ASSAY OF URINE CREATININE: CPT | Performed by: FAMILY MEDICINE

## 2024-09-30 ENCOUNTER — OFFICE VISIT (OUTPATIENT)
Dept: FAMILY MEDICINE | Facility: CLINIC | Age: 69
End: 2024-09-30
Payer: COMMERCIAL

## 2024-09-30 VITALS
HEIGHT: 68 IN | WEIGHT: 207.5 LBS | SYSTOLIC BLOOD PRESSURE: 158 MMHG | DIASTOLIC BLOOD PRESSURE: 102 MMHG | HEART RATE: 99 BPM | OXYGEN SATURATION: 97 % | BODY MASS INDEX: 31.45 KG/M2

## 2024-09-30 DIAGNOSIS — E11.65 TYPE 2 DIABETES MELLITUS WITH HYPERGLYCEMIA, WITHOUT LONG-TERM CURRENT USE OF INSULIN: Primary | ICD-10-CM

## 2024-09-30 DIAGNOSIS — I10 PRIMARY HYPERTENSION: ICD-10-CM

## 2024-09-30 DIAGNOSIS — G25.0 ESSENTIAL TREMOR: ICD-10-CM

## 2024-09-30 DIAGNOSIS — E78.49 OTHER HYPERLIPIDEMIA: ICD-10-CM

## 2024-09-30 PROCEDURE — 1125F AMNT PAIN NOTED PAIN PRSNT: CPT | Mod: CPTII,S$GLB,, | Performed by: FAMILY MEDICINE

## 2024-09-30 PROCEDURE — 1101F PT FALLS ASSESS-DOCD LE1/YR: CPT | Mod: CPTII,S$GLB,, | Performed by: FAMILY MEDICINE

## 2024-09-30 PROCEDURE — 3008F BODY MASS INDEX DOCD: CPT | Mod: CPTII,S$GLB,, | Performed by: FAMILY MEDICINE

## 2024-09-30 PROCEDURE — 3060F POS MICROALBUMINURIA REV: CPT | Mod: CPTII,S$GLB,, | Performed by: FAMILY MEDICINE

## 2024-09-30 PROCEDURE — 3077F SYST BP >= 140 MM HG: CPT | Mod: CPTII,S$GLB,, | Performed by: FAMILY MEDICINE

## 2024-09-30 PROCEDURE — 3066F NEPHROPATHY DOC TX: CPT | Mod: CPTII,S$GLB,, | Performed by: FAMILY MEDICINE

## 2024-09-30 PROCEDURE — 3080F DIAST BP >= 90 MM HG: CPT | Mod: CPTII,S$GLB,, | Performed by: FAMILY MEDICINE

## 2024-09-30 PROCEDURE — 4010F ACE/ARB THERAPY RXD/TAKEN: CPT | Mod: CPTII,S$GLB,, | Performed by: FAMILY MEDICINE

## 2024-09-30 PROCEDURE — 3046F HEMOGLOBIN A1C LEVEL >9.0%: CPT | Mod: CPTII,S$GLB,, | Performed by: FAMILY MEDICINE

## 2024-09-30 PROCEDURE — 1159F MED LIST DOCD IN RCRD: CPT | Mod: CPTII,S$GLB,, | Performed by: FAMILY MEDICINE

## 2024-09-30 PROCEDURE — 3288F FALL RISK ASSESSMENT DOCD: CPT | Mod: CPTII,S$GLB,, | Performed by: FAMILY MEDICINE

## 2024-09-30 PROCEDURE — 99999 PR PBB SHADOW E&M-EST. PATIENT-LVL III: CPT | Mod: PBBFAC,,, | Performed by: FAMILY MEDICINE

## 2024-09-30 PROCEDURE — 99214 OFFICE O/P EST MOD 30 MIN: CPT | Mod: S$GLB,,, | Performed by: FAMILY MEDICINE

## 2024-09-30 RX ORDER — TIRZEPATIDE 5 MG/.5ML
5 INJECTION, SOLUTION SUBCUTANEOUS
Qty: 4 PEN | Refills: 3 | Status: SHIPPED | OUTPATIENT
Start: 2024-10-30

## 2024-09-30 RX ORDER — VALSARTAN 40 MG/1
40 TABLET ORAL DAILY
Qty: 90 TABLET | Refills: 3 | Status: SHIPPED | OUTPATIENT
Start: 2024-09-30 | End: 2025-09-30

## 2024-09-30 RX ORDER — PRIMIDONE 50 MG/1
100 TABLET ORAL 2 TIMES DAILY
Qty: 60 TABLET | Refills: 11 | Status: SHIPPED | OUTPATIENT
Start: 2024-09-30 | End: 2025-09-25

## 2024-09-30 RX ORDER — TIRZEPATIDE 2.5 MG/.5ML
2.5 INJECTION, SOLUTION SUBCUTANEOUS
Qty: 4 PEN | Refills: 0 | Status: SHIPPED | OUTPATIENT
Start: 2024-09-30

## 2024-09-30 NOTE — PROGRESS NOTES
SUBJECTIVE:    Patient ID: Sushil Lucas is a 68 y.o. male.    Chief Complaint: Diabetes (Pt declined the influenza vaccine)  68-year-old male patient has a history of chronic arthritis of bilateral knees, hyperlipidemia, diabetes, hypertension his blood pressure is not well controlled today he is not on medications, his Diabetes is not well controlled and his A1C is 10.  Patient has not been taking his medications for htn but has been taking his metformin. Blood pressure today 158/102, he denies any chest pain shortness of breath or dyspnea on exertion.  He does have significant knee arthritis he has been on Celebrex  without relief.        Significant past medical history  Arthritis: Celebrex   Hyperlipidemia atorvastatin 20 mg   Diabetes type 2 metformin 500 mg  Hypertension propanolol 80 mg     Specialists     Smoke:  None  ETOH:  Exercise:      Hemoglobin A1C 4.5 - 6.2 % 10.0 High      Lipid  Chol: 121  Tri: 89  Hdl: 45  LDL: 58    Glucose 70 - 110 mg/dL 247 High      Creatinine 0.5 - 1.4 mg/dL 0.9     eGFR >60 mL/min/1.73 m^2 >60.0     Diabetes  He presents for his follow-up diabetic visit. He has type 2 diabetes mellitus. His disease course has been worsening. There are no hypoglycemic associated symptoms. Pertinent negatives for hypoglycemia include no headaches or sweats. Pertinent negatives for diabetes include no blurred vision, no chest pain, no fatigue, no foot ulcerations, no polydipsia, no polyphagia, no polyuria and no weight loss. There are no hypoglycemic complications. Symptoms are stable. Risk factors for coronary artery disease include diabetes mellitus, dyslipidemia, hypertension, male sex, obesity and sedentary lifestyle. Current diabetic treatment includes oral agent (monotherapy). He is compliant with treatment most of the time.   Hypertension  This is a chronic problem. The current episode started more than 1 year ago. The problem is unchanged. The problem is uncontrolled. Pertinent  negatives include no anxiety, blurred vision, chest pain, headaches, malaise/fatigue, neck pain, orthopnea, palpitations, peripheral edema, PND, shortness of breath or sweats. Risk factors for coronary artery disease include diabetes mellitus, dyslipidemia, male gender, obesity and sedentary lifestyle. Past treatments include nothing. The current treatment provides no improvement.   Hyperlipidemia  This is a chronic problem. The current episode started more than 1 year ago. The problem is controlled. Recent lipid tests were reviewed and are normal. Exacerbating diseases include diabetes and obesity. Pertinent negatives include no chest pain or shortness of breath. Current antihyperlipidemic treatment includes statins. The current treatment provides mild improvement of lipids.         Past Medical History:   Diagnosis Date    Diabetes mellitus, type 2     Hyperlipidemia      Social History     Socioeconomic History    Marital status:    Tobacco Use    Smoking status: Never    Smokeless tobacco: Never   Substance and Sexual Activity    Alcohol use: No     Comment: 1-2 times per year    Drug use: No     Social Drivers of Health     Stress: No Stress Concern Present (9/16/2019)    [facility] - Occupational Stress Questionnaire     Feeling of Stress : Not at all     Past Surgical History:   Procedure Laterality Date    AV FISTULA REPAIR      HERNIA REPAIR      SPINE SURGERY  12/2006    C6/7 ACDF     Family History   Problem Relation Name Age of Onset    Heart disease Father       Current Outpatient Medications   Medication Sig Dispense Refill    fluorometholone 0.1% (FML) 0.1 % DrpS       meloxicam (MOBIC) 15 MG tablet Take 1 tablet by mouth once daily 90 tablet 0    metFORMIN (GLUCOPHAGE-XR) 750 MG ER 24hr tablet Take 1 tablet by mouth once daily with breakfast 90 tablet 3    rosuvastatin (CRESTOR) 10 MG tablet Take 1 tablet by mouth once daily 90 tablet 0    empagliflozin  "(JARDIANCE) 10 mg tablet Take 1 tablet (10 mg total) by mouth once daily. 90 tablet 1    primidone (MYSOLINE) 50 MG Tab Take 2 tablets (100 mg total) by mouth 2 (two) times a day. 60 tablet 11    tirzepatide (MOUNJARO) 2.5 mg/0.5 mL PnIj Inject 2.5 mg into the skin every 7 days. 4 Pen 0    [START ON 10/30/2024] tirzepatide (MOUNJARO) 5 mg/0.5 mL PnIj Inject 5 mg into the skin every 7 days. 4 Pen 3    valsartan (DIOVAN) 40 MG tablet Take 1 tablet (40 mg total) by mouth once daily. 90 tablet 3     No current facility-administered medications for this visit.     Review of patient's allergies indicates:  No Known Allergies    Review of Systems   Constitutional:  Negative for activity change, appetite change, diaphoresis, fatigue, malaise/fatigue and weight loss.   HENT:  Negative for congestion, postnasal drip, rhinorrhea, sinus pressure and sinus pain.    Eyes:  Negative for blurred vision.   Respiratory:  Negative for cough, chest tightness, shortness of breath and wheezing.    Cardiovascular:  Negative for chest pain, palpitations, orthopnea and PND.   Gastrointestinal:  Negative for abdominal distention, abdominal pain, anal bleeding, blood in stool, constipation and diarrhea.   Endocrine: Negative for polydipsia, polyphagia and polyuria.   Genitourinary:  Negative for dysuria, flank pain, frequency, hematuria and urgency.   Musculoskeletal:  Positive for arthralgias. Negative for neck pain.   Neurological:  Negative for headaches.          Blood pressure (!) 158/102, pulse 99, height 5' 8" (1.727 m), weight 94.1 kg (207 lb 8 oz), SpO2 97%. Body mass index is 31.55 kg/m².   Objective:      Physical Exam  Vitals reviewed.   Constitutional:       General: He is not in acute distress.     Appearance: Normal appearance. He is not ill-appearing or toxic-appearing.   HENT:      Head: Normocephalic and atraumatic.      Nose: Nose normal. No congestion or rhinorrhea.      Mouth/Throat:      Mouth: Mucous membranes are " moist.      Pharynx: No oropharyngeal exudate or posterior oropharyngeal erythema.   Cardiovascular:      Rate and Rhythm: Normal rate and regular rhythm.      Pulses:           Dorsalis pedis pulses are 2+ on the right side and 2+ on the left side.        Posterior tibial pulses are 2+ on the right side and 2+ on the left side.      Heart sounds: Normal heart sounds. No murmur heard.  Pulmonary:      Effort: Pulmonary effort is normal. No respiratory distress.      Breath sounds: Normal breath sounds. No wheezing.   Musculoskeletal:      Right knee: Bony tenderness present.      Left knee: Bony tenderness present.      Right foot: Normal range of motion. No deformity, bunion, Charcot foot, foot drop or prominent metatarsal heads.      Left foot: Normal range of motion. No deformity, bunion, Charcot foot, foot drop or prominent metatarsal heads.   Feet:      Right foot:      Protective Sensation: 9 sites tested.  9 sites sensed.      Skin integrity: Callus present. No ulcer, blister, skin breakdown, erythema or warmth.      Left foot:      Protective Sensation: 9 sites tested.  9 sites sensed.      Skin integrity: Callus present. No ulcer, blister, skin breakdown, erythema or warmth.   Skin:     General: Skin is warm and dry.      Capillary Refill: Capillary refill takes less than 2 seconds.   Neurological:      Mental Status: He is alert and oriented to person, place, and time.             Assessment:       1. Type 2 diabetes mellitus with hyperglycemia, without long-term current use of insulin    2. Other hyperlipidemia    3. Primary hypertension    4. Essential tremor         Plan:           Type 2 diabetes mellitus with hyperglycemia, without long-term current use of insulin  -     tirzepatide (MOUNJARO) 2.5 mg/0.5 mL PnIj; Inject 2.5 mg into the skin every 7 days.  Dispense: 4 Pen; Refill: 0  -     tirzepatide (MOUNJARO) 5 mg/0.5 mL PnIj; Inject 5 mg into the skin every 7 days.  Dispense: 4 Pen; Refill: 3  -      HEMOGLOBIN A1C; Future; Expected date: 09/30/2024  -     empagliflozin (JARDIANCE) 10 mg tablet; Take 1 tablet (10 mg total) by mouth once daily.  Dispense: 90 tablet; Refill: 1  Controlled will start on Jardiance and start G LP 1, will get A1c 3  Other hyperlipidemia  Lipids controlled currently on can you does  Primary hypertension  -     valsartan (DIOVAN) 40 MG tablet; Take 1 tablet (40 mg total) by mouth once daily.  Dispense: 90 tablet; Refill: 3  Blood pressure well controlled will start on valsartan 40 mg  Essential tremor  -     primidone (MYSOLINE) 50 MG Tab; Take 2 tablets (100 mg total) by mouth 2 (two) times a day.  Dispense: 60 tablet; Refill: 11

## 2024-10-01 NOTE — TELEPHONE ENCOUNTER
----- Message from Millicent sent at 10/1/2024  9:44 AM CDT -----  Two of the medications that pt was prescribed was too  high for pt with insurance and would like to know if there is an alternative/  [phone]

## 2024-10-01 NOTE — TELEPHONE ENCOUNTER
Called and spoke with the patient regarding his medication. This is for the Jardiance and Mounjaro.  Is there an alternative, if needed he will go without the Mounjaro.    LOV 9-30-24  NOV 12-30-24

## 2024-10-02 ENCOUNTER — PATIENT OUTREACH (OUTPATIENT)
Dept: ADMINISTRATIVE | Facility: HOSPITAL | Age: 69
End: 2024-10-02
Payer: COMMERCIAL

## 2024-10-02 NOTE — PROGRESS NOTES
Population Health Chart Review & Patient Outreach Details      Additional [facility] Health Notes:               Updates Requested / Reviewed:      Updated Care Coordination Note, Care Everywhere, , Care Team Updated, and Immunizations Reconciliation Completed or Queried: [facility] Topics Overdue:      [facility] Score: 3     Eye Exam  Uncontrolled BP  AAA Screening    Influenza Vaccine  Pneumonia Vaccine  Tetanus Vaccine  Shingles/Zoster Vaccine  RSV Vaccine                  Health Maintenance Topic(s) Outreach Outcomes & Actions Taken:    Eye Exam - Outreach Outcomes & Actions Taken  : External Records Requested & Care Team Updated if Applicable

## 2024-10-02 NOTE — LETTER
AUTHORIZATION FOR RELEASE OF   CONFIDENTIAL INFORMATION    Dear [facility],    We are seeing Sushil Lucas, date of birth 1955, in the clinic at [facility] [facility]. Edgar Kam MD is the patient's PCP. Sushil Lucas has an outstanding lab/procedure at the time we reviewed his chart. In order to help keep his health information updated, he has authorized us to request the following medical record(s):        (  x) EYE EXAM      Please fax records to [facility], Arnold, Ryan D., MD, [phone]     If you have any questions, please contact       Usha Urena  Nurse Clinical Care Coordinator  [facility]  Phone: [phone]  Fax: [phone]    Patient Name: Sushil Lucas  : 1955  Patient Phone #: [phone]                Sushil Lucas  MRN: [id number]  : 1955  Age: 68 y.o.  Sex: male         Patient/Legal Guardian Signature  This signature was collected at 2024    [facility]       _______________________________   Printed Name/Relationship to Patient      Consent for Examination and Treatment: I hereby authorize the providers and employees of [facility] ([facility]) to provide medical treatment/services which includes, but is not limited to, performing and administering tests and diagnostic procedures that are deemed necessary, including, but not limited to, imaging examinations, blood tests and other laboratory procedures as may be required by the hospital, clinic, or may be ordered by my physician(s) or persons working under the general and/or special instructions of my physician(s).      I understand and agree that this consent covers all authorized persons, including but not limited to physicians, residents, nurse practitioners, physicians' assistants, specialists, consultants, student nurses, and independently contracted physicians, who are called upon by the physician in charge, to carry out  the diagnostic procedures and medical or surgical treatment.     I hereby authorize [facility] to retain or dispose of any specimens or tissue, should there be such remaining from any test or procedure.     I hereby authorize and give consent for [facility] providers and employees to take photographs, images or videotapes of such diagnostic, surgical or treatment procedures of Patient as may be required by [facility] or as may be ordered by a physician. I further acknowledge and agree that [facility] may use cameras or other devices for patient monitoring.     I am aware that the practice of medicine is not an exact science, and I acknowledge that no guarantees have been made to me as to the outcome of any tests, procedures or treatment.     Authorization for Release of Information: I understand that my insurance company and/or their agents may need information necessary to make determinations about payment/reimbursement. I hereby provide authorization to release to all insurance companies, their successors, assignees, other parties with whom they may have contracted, or others acting on their behalf, that are involved with payment for any hospital and/or clinic charges incurred by the patient, any information that they request and deem necessary for payment/reimbursement, and/or quality review.  I further authorize the release of my health information to physicians or other health care practitioners on staff who are involved in my health care now and in the future, and to other health care providers, entities, or institutions for the purpose of my continued care and treatment, including referrals.     REGISTRATION AUTHORIZATION  Form No. 13284 (Rev. 3/25/2024)    Page 1 of 3                       Medicare Patient's Certification and Authorization to Release Information and Payment Request:  I certify that the information given by me in applying for payment under Title XVIII of the Social Security Act is correct. I authorize  any castle of medical or other information about me to release to the [facility], or its intermediaries or carriers, any information needed for this or a related Medicare claim. I request that payment of authorized benefits be made on my behalf.     Assignment of Insurance Benefits:   I hereby authorize any and all insurance companies, health plans, defined   benefit plans, health insurers or any entity that is or may be responsible for payment of my medical expenses to pay all hospital and medical benefits now due, and to become due and payable to me under any hospital benefits, sick benefits, injury benefits or any other benefit for services rendered to me, including Major Medical Benefits, direct to [facility] and all independently contracted physicians. I assign any and all rights that I may have against any and all insurance companies, health plans, defined benefit plans, health insurers or any entity that is or may be responsible for payment of my medical expenses, including, but not limited to any right to appeal a denial of a claim, any right to bring any action, lawsuit, administrative proceeding, or other cause of action on my behalf. I specifically assign my right to pursue litigation against any and all insurance companies, health plans, defined benefit plans, health insurers or any entity that is or may be responsible for payment of my medical expenses based upon a refusal to pay charges.            E. Valuables: It is understood and agreed that [facility] is not liable for the damage to or loss of any money, jewelry,   documents, dentures, eye glasses, hearing aids, prosthetics, or other property of value.     F. Computer Equipment: I understand and agree that should I choose to use computer equipment owned by [facility] or if I choose to access the Internet via [facility]s network, I do so at my own risk. [email or web address] is not responsible for any damage to my computer equipment or to any damages of  any type that might arise from my loss of equipment or data.     G. Acceptance of Financial Responsibility:  I agree that in consideration of the services and   supplies that have been   or will be furnished to the patient, I am hereby obligated to pay all charges made for or on the account of the patient according to the standard rates (in effect at the time the services and supplies are delivered) established by [facility], including its Patient Financial Assistance Policy to the extent it is applicable. I understand that I am responsible for all charges, or portions thereof, not covered by insurance or other sources. Patient refunds will be distributed only after balances at all [facility] facilities are paid.     H. Communication Authorization:  I hereby authorize [facility] and its representatives, along with any billing service   or  who may work on their behalf, to contact me on   my cell phone and/or home phone using pre- recorded messages, artificial voice messages, automatic telephone dialing devices or other computer assisted technology, or by electronic      mail, text messaging, or by any other form of electronic communication. This includes, but is not limited to, appointment reminders, yearly physical exam reminders, preventive care reminders, patient campaigns, welcome calls, and calls about account balances on my account or any account on which I am listed as a guarantor. I understand I have the right to opt out of these communications at any time.      Relationship  Between  Facility and  Provider:      I understand that some, but not all, providers furnishing services to the patient are not employees or agents of [facility]. The patient is under the care and supervision of his/her attending physician, and it is the responsibility of the facility and its nursing staff to carry out the instructions of such physicians. It is the responsibility of the patient's physician/designee to obtain the  patient's informed consent, when required, for medical or surgical treatment, special diagnostic or therapeutic procedures, or hospital services rendered for the patient under the special instructions of the physician/designee.           REGISTRATION AUTHORIZATION  Form No. 71436 (Rev. 3/25/2024)    Page 2 of 3                       Immunizations: [facility] shares immunization information with state sponsored health departments to help you and your doctor keep track of your immunization records. By signing, you consent to have this information shared with the health department in your state:                                [place] - LINKS ([place] Immunization Network for Kids Statewide)                                [place] - MIIX ([place] Immunization Information eXchange)                                [place] - ImmPRINT (Immunization Patient Registry with Integrated Technology)     TERM: This authorization is valid for this and subsequent care/treatment I receive at [facility] and will remain valid unless/until revoked in writing by me.     [facility]: As used in this document, [facility] means all [facility] owned and managed facilities, including, but not limited to, all health centers, surgery centers, clinics, urgent care centers, and hospitals.         [facility] complies with applicable Federal civil rights laws and does not discriminate on the basis of race, color, national origin, age, disability, or sex.  ATENCIÓN: si habla isa, tiene a valencia disposición servicios gratuitos de asistencia lingüística. Gary al [phone].  [facility] Ý: N?u b?n nói Ti?ng Vi?t, có các d?ch v? h? tr? ngôn ng? mi?n phí dành cho b?n. G?i s? [phone].        REGISTRATION AUTHORIZATION  Form No. 80212 (Rev. 3/25/2024)   Page 3 of 3

## 2024-10-04 ENCOUNTER — TELEPHONE (OUTPATIENT)
Dept: FAMILY MEDICINE | Facility: CLINIC | Age: 69
End: 2024-10-04
Payer: COMMERCIAL

## 2024-10-04 RX ORDER — ROSUVASTATIN CALCIUM 10 MG/1
10 TABLET, COATED ORAL DAILY
Qty: 90 TABLET | Refills: 0 | Status: SHIPPED | OUTPATIENT
Start: 2024-10-04

## 2024-10-04 NOTE — TELEPHONE ENCOUNTER
----- Message from Millicent sent at 10/4/2024  8:06 AM CDT -----  Wife Radha calling about diabetes and appetite medication not being covered by insurance. Is there an alternative for pt? Medication was too high in price for pt. Please leave a detailed message for wife due to her being at work.   [phone]

## 2024-10-07 ENCOUNTER — TELEPHONE (OUTPATIENT)
Dept: FAMILY MEDICINE | Facility: CLINIC | Age: 69
End: 2024-10-07
Payer: COMMERCIAL

## 2024-10-07 RX ORDER — GLIPIZIDE 5 MG/1
5 TABLET, FILM COATED, EXTENDED RELEASE ORAL
Qty: 90 TABLET | Refills: 3 | Status: SHIPPED | OUTPATIENT
Start: 2024-10-07 | End: 2025-10-07

## 2024-10-07 NOTE — TELEPHONE ENCOUNTER
I did the PA on Mounjaro which was approved and the Jardiance did not need a PA. I spoke with the pharmacy and the cost of the Mounjaro with the PA is $1000 and the Jardiance is $1729. They cannot afford those medications.   Please advise

## 2024-10-07 NOTE — TELEPHONE ENCOUNTER
Mounjaro PA Approved    Prior authorization approved  Payer: [employer] Patient's Payer Case ID: [id number]    [id number]  Note from payer: CaseId:[id number];Status:Approved;Review Type:Prior Auth;Coverage Start Date:10/07/2024;Coverage End Date:10/07/2025;  Approval Details    Authorized from October 7, 2024 to October 7, 2025  Electronic appeal: Not supported

## 2024-10-07 NOTE — TELEPHONE ENCOUNTER
Mounjaro PA Approved    Prior authorization approved  Payer: [employer] Patient's Payer Case ID: [id number]    [phone]  Note from payer: CaseId:[id number];Status:Approved;Review Type:Prior Auth;Coverage Start Date:10/07/2024;Coverage End Date:10/07/2025;  Approval Details    Authorized from October 7, 2024 to October 7, 2025  Electronic appeal: Not supported  View History

## 2024-10-08 ENCOUNTER — PATIENT OUTREACH (OUTPATIENT)
Dept: ADMINISTRATIVE | Facility: HOSPITAL | Age: 69
End: 2024-10-08
Payer: COMMERCIAL

## 2024-10-08 ENCOUNTER — PATIENT OUTREACH (OUTPATIENT)
Dept: ADMINISTRATIVE | Facility: OTHER | Age: 69
End: 2024-10-08

## 2024-10-08 DIAGNOSIS — E11.9 TYPE 2 DIABETES MELLITUS WITHOUT COMPLICATION, WITHOUT LONG-TERM CURRENT USE OF INSULIN: Primary | ICD-10-CM

## 2024-10-08 DIAGNOSIS — Z59.9 FINANCIAL DIFFICULTY: ICD-10-CM

## 2024-10-08 DIAGNOSIS — I10 PRIMARY HYPERTENSION: ICD-10-CM

## 2024-10-08 SDOH — SOCIAL DETERMINANTS OF HEALTH (SDOH): PROBLEM RELATED TO HOUSING AND ECONOMIC CIRCUMSTANCES, UNSPECIFIED: Z59.9

## 2024-10-08 NOTE — LETTER
AUTHORIZATION FOR RELEASE OF   CONFIDENTIAL INFORMATION    Dear [facility],      We are seeing Sushil Lucas, date of birth 1955, in the clinic at [facility] [address]. Edgar Kam MD is the patient's PCP. Sushil Lucas has an outstanding lab/procedure at the time we reviewed his chart. In order to help keep his health information updated, he has authorized us to request the following medical record(s):           ( x )  EYE EXAM                Please fax records to [facility], Arnold, Ryan D., MD, [phone]     If you have any questions, please contact       Usha Urena  Nurse Clinical Care Coordinator  [facility]  Phone: [phone]  Fax: [phone]      Patient Name: Sushil Lucas  : 1955  Patient Phone #: [phone]                Sushil Lucas  MRN: [id number]  : 1955  Age: 68 y.o.  Sex: male         Patient/Legal Guardian Signature  This signature was collected at 2024    [facility]       _______________________________   Printed Name/Relationship to Patient      Consent for Examination and Treatment: I hereby authorize the providers and employees of [facility] ([facility]) to provide medical treatment/services which includes, but is not limited to, performing and administering tests and diagnostic procedures that are deemed necessary, including, but not limited to, imaging examinations, blood tests and other laboratory procedures as may be required by the hospital, clinic, or may be ordered by my physician(s) or persons working under the general and/or special instructions of my physician(s).      I understand and agree that this consent covers all authorized persons, including but not limited to physicians, residents, nurse practitioners, physicians' assistants, specialists, consultants, student nurses, and independently contracted physicians, who are called upon by the physician in  charge, to carry out the diagnostic procedures and medical or surgical treatment.     I hereby authorize [facility] to retain or dispose of any specimens or tissue, should there be such remaining from any test or procedure.     I hereby authorize and give consent for [facility] providers and employees to take photographs, images or videotapes of such diagnostic, surgical or treatment procedures of Patient as may be required by [facility] or as may be ordered by a physician. I further acknowledge and agree that [facility] may use cameras or other devices for patient monitoring.     I am aware that the practice of medicine is not an exact science, and I acknowledge that no guarantees have been made to me as to the outcome of any tests, procedures or treatment.     Authorization for Release of Information: I understand that my insurance company and/or their agents may need information necessary to make determinations about payment/reimbursement. I hereby provide authorization to release to all insurance companies, their successors, assignees, other parties with whom they may have contracted, or others acting on their behalf, that are involved with payment for any hospital and/or clinic charges incurred by the patient, any information that they request and deem necessary for payment/reimbursement, and/or quality review.  I further authorize the release of my health information to physicians or other health care practitioners on staff who are involved in my health care now and in the future, and to other health care providers, entities, or institutions for the purpose of my continued care and treatment, including referrals.     REGISTRATION AUTHORIZATION  Form No. 36398 (Rev. 3/25/2024)    Page 1 of 3                       Medicare Patient's Certification and Authorization to Release Information and Payment Request:  I certify that the information given by me in applying for payment under Title XVIII of the Social Security Act is  correct. I authorize any castle of medical or other information about me to release to the [facility], or its intermediaries or carriers, any information needed for this or a related Medicare claim. I request that payment of authorized benefits be made on my behalf.     Assignment of Insurance Benefits:   I hereby authorize any and all insurance companies, health plans, defined   benefit plans, health insurers or any entity that is or may be responsible for payment of my medical expenses to pay all hospital and medical benefits now due, and to become due and payable to me under any hospital benefits, sick benefits, injury benefits or any other benefit for services rendered to me, including Major Medical Benefits, direct to [facility] and all independently contracted physicians. I assign any and all rights that I may have against any and all insurance companies, health plans, defined benefit plans, health insurers or any entity that is or may be responsible for payment of my medical expenses, including, but not limited to any right to appeal a denial of a claim, any right to bring any action, lawsuit, administrative proceeding, or other cause of action on my behalf. I specifically assign my right to pursue litigation against any and all insurance companies, health plans, defined benefit plans, health insurers or any entity that is or may be responsible for payment of my medical expenses based upon a refusal to pay charges.            E. Valuables: It is understood and agreed that [facility] is not liable for the damage to or loss of any money, jewelry,   documents, dentures, eye glasses, hearing aids, prosthetics, or other property of value.     F. Computer Equipment: I understand and agree that should I choose to use computer equipment owned by [facility] or if I choose to access the Internet via [facility]s network, I do so at my own risk. [facility] is not responsible for any damage to my computer equipment  or to any damages of any type that might arise from my loss of equipment or data.     G. Acceptance of Financial Responsibility:  I agree that in consideration of the services and   supplies that have been   or will be furnished to the patient, I am hereby obligated to pay all charges made for or on the account of the patient according to the standard rates (in effect at the time the services and supplies are delivered) established by [facility], including its Patient Financial Assistance Policy to the extent it is applicable. I understand that I am responsible for all charges, or portions thereof, not covered by insurance or other sources. Patient refunds will be distributed only after balances at all [facility] facilities are paid.     H. Communication Authorization:  I hereby authorize [facility] and its representatives, along with any billing service   or  who may work on their behalf, to contact me on   my cell phone and/or home phone using pre- recorded messages, artificial voice messages, automatic telephone dialing devices or other computer assisted technology, or by electronic      mail, text messaging, or by any other form of electronic communication. This includes, but is not limited to, appointment reminders, yearly physical exam reminders, preventive care reminders, patient campaigns, welcome calls, and calls about account balances on my account or any account on which I am listed as a guarantor. I understand I have the right to opt out of these communications at any time.      Relationship  Between  Facility and  Provider:      I understand that some, but not all, providers furnishing services to the patient are not employees or agents of [facility]. The patient is under the care and supervision of his/her attending physician, and it is the responsibility of the facility and its nursing staff to carry out the instructions of such physicians. It is the responsibility of the patient's  physician/designee to obtain the patient's informed consent, when required, for medical or surgical treatment, special diagnostic or therapeutic procedures, or hospital services rendered for the patient under the special instructions of the physician/designee.           REGISTRATION AUTHORIZATION  Form No. 45933 (Rev. 3/25/2024)    Page 2 of 3                       Immunizations: [facility] shares immunization information with state sponsored health departments to help you and your doctor keep track of your immunization records. By signing, you consent to have this information shared with the health department in your state:                                [place] - LINKS ([place] Immunization Network for Kids Statewide)                                [place] - MIIX ([place] Immunization Information eXchange)                                [place] - ImmPRINT (Immunization Patient Registry with Integrated Technology)     TERM: This authorization is valid for this and subsequent care/treatment I receive at [facility] and will remain valid unless/until revoked in writing by me.     [facility]: As used in this document, [facility] means all [facility] owned and managed facilities, including, but not limited to, all health centers, surgery centers, clinics, urgent care centers, and hospitals.         [facility] complies with applicable Federal civil rights laws and does not discriminate on the basis of race, color, national origin, age, disability, or sex.  ATENCIÓN: si habla isa, tiene a valencia disposición servicios gratuitos de asistencia lingüística. Gary al [phone].  CHÚ Ý: N?u b?n nói Ti?ng Vi?t, có các d?ch v? h? tr? ngôn ng? mi?n phí dành cho b?n. G?i s? [phone].        REGISTRATION AUTHORIZATION  Form No. 03237 (Rev. 3/25/2024)   Page 3 of 3     Patient

## 2024-10-08 NOTE — TELEPHONE ENCOUNTER
I sent in a new medication, patient may want to consider starting on Medicare they are more likely to cover routine medications.

## 2024-10-08 NOTE — TELEPHONE ENCOUNTER
Spoke to patient about a different medication being sent in and letting him know that he should consider going on Medicare even if its a secondary insurance or the other way around. Advised him that they would most likely pay for more of the medications. Patient stated he understood and would look into it.

## 2024-10-08 NOTE — PROGRESS NOTES
The patient states that he is waiting on his new script for the rosuvastatin (CRESTOR) 10 MG tablet . I will send a message to his provider. He declines having tests right now due to finances. The patient is not taking his blood pressure at this time. He was advised to take his blood pressure and send the readings into the office for evaluation. He did verbalize understanding.       Thanks,    Usha Chaudhari  Nurse Clinical Care Coordinator  [facility]  Phone: [phone]  Fax: [phone]

## 2024-10-08 NOTE — PROGRESS NOTES
CHW - Case Closure    This Community Health Worker spoke to patient via telephone today.   Pt/Caregiver reported: Lvm to notify pt, if in need in the future to give me a call. Unable to reach pt.   Pt/Caregiver denied any additional needs at this time and agrees with episode closure at this time.  Provided patient with Community Health Worker's contact information and encouraged him/her to contact this Community Health Worker if additional needs arise.          CHW - Outreach Attempt    Community Health Worker left a voicemail message for 2nd attempt to contact patient regarding: Lvm to notify pt if in need of resources to give me a call. 2nd attempt.   Community Health Worker to attempt to contact patient on:   11/27/24          CHW - Outreach Attempt    Community Health Worker left a voicemail message for 1st attempt to contact patient regarding: Lvm notifying pt if in need of resources to give me a call.   Community Health Worker to attempt to contact patient on:  10/29/24

## 2024-10-19 DIAGNOSIS — M19.90 ARTHRITIS: ICD-10-CM

## 2024-10-21 RX ORDER — MELOXICAM 15 MG/1
15 TABLET ORAL
Qty: 90 TABLET | Refills: 0 | Status: SHIPPED | OUTPATIENT
Start: 2024-10-21

## 2024-12-13 ENCOUNTER — TELEPHONE (OUTPATIENT)
Dept: FAMILY MEDICINE | Facility: CLINIC | Age: 69
End: 2024-12-13
Payer: COMMERCIAL

## 2024-12-13 NOTE — TELEPHONE ENCOUNTER
pt out reach regarding annual eye exam. Patient stated he had one done 3 months ago at the [facility] on [address] in [facility]. Requested eye report from [facility]

## 2024-12-18 ENCOUNTER — PATIENT OUTREACH (OUTPATIENT)
Dept: ADMINISTRATIVE | Facility: HOSPITAL | Age: 69
End: 2024-12-18
Payer: COMMERCIAL

## 2024-12-18 NOTE — LETTER
AUTHORIZATION FOR RELEASE OF   CONFIDENTIAL INFORMATION    Dear [facility] [place], Joshua Mckinney,      We are seeing Sushil Lucas, date of birth 1955, in the clinic at [facility] [address]. Edgar Kam MD is the patient's PCP. Sushil Lucas has an outstanding lab/procedure at the time we reviewed his chart. In order to help keep his health information updated, he has authorized us to request the following medical record(s):        (x  )  EYE EXAM                                         Please fax [facility] to [facility], Arnold, Ryan D., MD, [phone]     If you have any questions, please contact       Usha Urena  Nurse Clinical Care Coordinator  [facility]  Phone: [phone]  Fax: [phone]    Patient Name: Sushil Lucas  : 1955  Patient Phone #: [phone]                Sushil Lucas  MRN: [id number]  : 1955  Age: 68 y.o.  Sex: male         Patient/Legal Guardian Signature  This signature was collected at 2024    [facility]       _______________________________   Printed Name/Relationship to Patient      Consent for Examination and Treatment: I hereby authorize the providers and employees of [facility] ([facility]) to provide medical treatment/services which includes, but is not limited to, performing and administering tests and diagnostic procedures that are deemed necessary, including, but not limited to, imaging examinations, blood tests and other laboratory procedures as may be required by the hospital, clinic, or may be ordered by my physician(s) or persons working under the general and/or special instructions of my physician(s).      I understand and agree that this consent covers all authorized persons, including but not limited to physicians, residents, nurse practitioners, physicians' assistants, specialists, consultants, student nurses, and independently contracted physicians, who are  called upon by the physician in charge, to carry out the diagnostic procedures and medical or surgical treatment.     I hereby authorize [facility] to retain or dispose of any specimens or tissue, should there be such remaining from any test or procedure.     I hereby authorize and give consent for [facility] providers and employees to take photographs, images or videotapes of such diagnostic, surgical or treatment procedures of Patient as may be required by [facility] or as may be ordered by a physician. I further acknowledge and agree that [facility] may use cameras or other devices for patient monitoring.     I am aware that the practice of medicine is not an exact science, and I acknowledge that no guarantees have been made to me as to the outcome of any tests, procedures or treatment.     Authorization for Release of Information: I understand that my insurance company and/or their agents may need information necessary to make determinations about payment/reimbursement. I hereby provide authorization to release to all insurance companies, their successors, assignees, other parties with whom they may have contracted, or others acting on their behalf, that are involved with payment for any hospital and/or clinic charges incurred by the patient, any information that they request and deem necessary for payment/reimbursement, and/or quality review.  I further authorize the release of my health information to physicians or other health care practitioners on staff who are involved in my health care now and in the future, and to other health care providers, entities, or institutions for the purpose of my continued care and treatment, including referrals.     REGISTRATION AUTHORIZATION  Form No. 65889 (Rev. 3/25/2024)    Page 1 of 3                       Medicare Patient's Certification and Authorization to Release Information and Payment Request:  I certify that the information given by me in applying for payment under Title XVIII  of the Social Security Act is correct. I authorize any castle of medical or other information about me to release to the [employer] SecurityAdministration, or its intermediaries or carriers, any information needed for this or a related Medicare claim. I request that payment of authorized benefits be made on my behalf.     Assignment of Insurance Benefits:   I hereby authorize any and all insurance companies, health plans, defined   benefit plans, health insurers or any entity that is or may be responsible for payment of my medical expenses to pay all hospital and medical benefits now due, and to become due and payable to me under any hospital benefits, sick benefits, injury benefits or any other benefit for services rendered to me, including Major Medical Benefits, direct to [facility] and all independently contracted physicians. I assign any and all rights that I may have against any and all insurance companies, health plans, defined benefit plans, health insurers or any entity that is or may be responsible for payment of my medical expenses, including, but not limited to any right to appeal a denial of a claim, any right to bring any action, lawsuit, administrative proceeding, or other cause of action on my behalf. I specifically assign my right to pursue litigation against any and all insurance companies, health plans, defined benefit plans, health insurers or any entity that is or may be responsible for payment of my medical expenses based upon a refusal to pay charges.            E. Valuables: It is understood and agreed that [facility] is not liable for the damage to or loss of any money, jewelry,   documents, dentures, eye glasses, hearing aids, prosthetics, or other property of value.     F. Computer Equipment: I understand and agree that should I choose to use computer equipment owned by [facility] or if I choose to access the Internet via [facility]s network, I do so at my own risk. [facility] is not responsible for any  damage to my computer equipment or to any damages of any type that might arise from my loss of equipment or data.     G. Acceptance of Financial Responsibility:  I agree that in consideration of the services and   supplies that have been   or will be furnished to the patient, I am hereby obligated to pay all charges made for or on the account of the patient according to the standard rates (in effect at the time the services and supplies are delivered) established by [facility], including its Patient Financial Assistance Policy to the extent it is applicable. I understand that I am responsible for all charges, or portions thereof, not covered by insurance or other sources. Patient refunds will be distributed only after balances at all [facility] facilities are paid.     H. Communication Authorization:  I hereby authorize [facility] and its representatives, along with any billing service   or  who may work on their behalf, to contact me on   my cell phone and/or home phone using pre- recorded messages, artificial voice messages, automatic telephone dialing devices or other computer assisted technology, or by electronic      mail, text messaging, or by any other form of electronic communication. This includes, but is not limited to, appointment reminders, yearly physical exam reminders, preventive care reminders, patient campaigns, welcome calls, and calls about account balances on my account or any account on which I am listed as a guarantor. I understand I have the right to opt out of these communications at any time.      Relationship  Between  Facility and  Provider:      I understand that some, but not all, providers furnishing services to the patient are not employees or agents of [facility]. The patient is under the care and supervision of his/her attending physician, and it is the responsibility of the facility and its nursing staff to carry out the instructions of such physicians. It is the responsibility  of the patient's physician/designee to obtain the patient's informed consent, when required, for medical or surgical treatment, special diagnostic or therapeutic procedures, or hospital services rendered for the patient under the special instructions of the physician/designee.           REGISTRATION AUTHORIZATION  Form No. 16489 (Rev. 3/25/2024)    Page 2 of 3                       Immunizations: [facility] shares immunization information with state sponsored health departments to help you and your doctor keep track of your immunization records. By signing, you consent to have this information shared with the health department in your state:                                [place] - LINKS ([place] Immunization Network for Kids Statewide)                                [place] - MIIX ([place] Immunization Information eXchange)                                [place] - ImmPRINT (Immunization Patient Registry with Integrated Technology)     TERM: This authorization is valid for this and subsequent care/treatment I receive at [facility] and will remain valid unless/until revoked in writing by me.     [facility]: As used in this document, [facility] means all [facility] owned and managed facilities, including, but not limited to, all health centers, surgery centers, clinics, urgent care centers, and hospitals.         [facility] complies with applicable Federal civil rights laws and does not discriminate on the basis of race, color, national origin, age, disability, or sex.  ATENCIÓN: si habla isa, tiene a valencia disposición servicios gratuitos de asistencia lingüística. Llame al [phone].  CHÚ Ý: N?u b?n nói Ti?ng Vi?t, có các d?ch v? h? tr? ngôn ng? mi?n phí dành cho b?n. G?i s? [phone].        REGISTRATION AUTHORIZATION  Form No. 05438 (Rev. 3/25/2024)   Page 3 of 3     Patient

## 2024-12-18 NOTE — PROGRESS NOTES
Population Health Chart Review & Patient Outreach Details      Additional [facility] Notes:               Updates Requested / Reviewed:      Updated Care Coordination Note, Care Everywhere, , Care Team Updated, and Immunizations Reconciliation Completed or Queried: [facility] Topics Overdue:      [facility] Score: 3     Eye Exam  Uncontrolled BP  AAA Screening    Influenza Vaccine  Pneumonia Vaccine  Tetanus Vaccine  Shingles/Zoster Vaccine  RSV Vaccine                  Health Maintenance Topic(s) Outreach Outcomes & Actions Taken:    Eye Exam - Outreach Outcomes & Actions Taken  : External Records Requested & Care Team Updated if Applicable

## 2024-12-27 ENCOUNTER — TELEPHONE (OUTPATIENT)
Dept: FAMILY MEDICINE | Facility: CLINIC | Age: 69
End: 2024-12-27
Payer: COMMERCIAL

## 2024-12-27 NOTE — TELEPHONE ENCOUNTER
Returned the patient back and received voicemail advising patient to go to Urgent care or emergency room.  Left message and call back number.

## 2024-12-27 NOTE — TELEPHONE ENCOUNTER
----- Message from Cecily sent at 12/26/2024  9:18 AM CST -----  The patient wants something called in for a  URI and a bad cough. He needs a refill on his Flonase. Susan on [address]. Pt's # [phone] GH

## 2025-01-02 ENCOUNTER — PATIENT MESSAGE (OUTPATIENT)
Dept: ADMINISTRATIVE | Facility: HOSPITAL | Age: 70
End: 2025-01-02
Payer: COMMERCIAL

## 2025-01-13 ENCOUNTER — OFFICE VISIT (OUTPATIENT)
Dept: FAMILY MEDICINE | Facility: CLINIC | Age: 70
End: 2025-01-13
Payer: COMMERCIAL

## 2025-01-13 ENCOUNTER — TELEPHONE (OUTPATIENT)
Dept: FAMILY MEDICINE | Facility: CLINIC | Age: 70
End: 2025-01-13
Payer: COMMERCIAL

## 2025-01-13 VITALS
HEIGHT: 68 IN | SYSTOLIC BLOOD PRESSURE: 138 MMHG | DIASTOLIC BLOOD PRESSURE: 86 MMHG | OXYGEN SATURATION: 97 % | TEMPERATURE: 98 F | HEART RATE: 98 BPM | WEIGHT: 213 LBS | BODY MASS INDEX: 32.28 KG/M2

## 2025-01-13 DIAGNOSIS — R05.9 COUGH, UNSPECIFIED TYPE: ICD-10-CM

## 2025-01-13 DIAGNOSIS — E11.65 TYPE 2 DIABETES MELLITUS WITH HYPERGLYCEMIA, WITHOUT LONG-TERM CURRENT USE OF INSULIN: ICD-10-CM

## 2025-01-13 DIAGNOSIS — J20.9 ACUTE BRONCHITIS, UNSPECIFIED ORGANISM: ICD-10-CM

## 2025-01-13 DIAGNOSIS — R06.2 WHEEZE: Primary | ICD-10-CM

## 2025-01-13 PROCEDURE — 3079F DIAST BP 80-89 MM HG: CPT | Mod: CPTII,S$GLB,, | Performed by: NURSE PRACTITIONER

## 2025-01-13 PROCEDURE — 99214 OFFICE O/P EST MOD 30 MIN: CPT | Mod: S$GLB,,, | Performed by: NURSE PRACTITIONER

## 2025-01-13 PROCEDURE — 1159F MED LIST DOCD IN RCRD: CPT | Mod: CPTII,S$GLB,, | Performed by: NURSE PRACTITIONER

## 2025-01-13 PROCEDURE — 3288F FALL RISK ASSESSMENT DOCD: CPT | Mod: CPTII,S$GLB,, | Performed by: NURSE PRACTITIONER

## 2025-01-13 PROCEDURE — 1101F PT FALLS ASSESS-DOCD LE1/YR: CPT | Mod: CPTII,S$GLB,, | Performed by: NURSE PRACTITIONER

## 2025-01-13 PROCEDURE — 1126F AMNT PAIN NOTED NONE PRSNT: CPT | Mod: CPTII,S$GLB,, | Performed by: NURSE PRACTITIONER

## 2025-01-13 PROCEDURE — 99999 PR PBB SHADOW E&M-EST. PATIENT-LVL III: CPT | Mod: PBBFAC,,, | Performed by: NURSE PRACTITIONER

## 2025-01-13 PROCEDURE — 3008F BODY MASS INDEX DOCD: CPT | Mod: CPTII,S$GLB,, | Performed by: NURSE PRACTITIONER

## 2025-01-13 PROCEDURE — 3075F SYST BP GE 130 - 139MM HG: CPT | Mod: CPTII,S$GLB,, | Performed by: NURSE PRACTITIONER

## 2025-01-13 RX ORDER — METHYLPREDNISOLONE 4 MG/1
TABLET ORAL
Qty: 1 EACH | Refills: 0 | Status: SHIPPED | OUTPATIENT
Start: 2025-01-13

## 2025-01-13 RX ORDER — PROMETHAZINE HYDROCHLORIDE AND DEXTROMETHORPHAN HYDROBROMIDE 6.25; 15 MG/5ML; MG/5ML
5 SYRUP ORAL EVERY 6 HOURS PRN
COMMUNITY
Start: 2025-01-07

## 2025-01-13 RX ORDER — BENZONATATE 100 MG/1
100 CAPSULE ORAL 3 TIMES DAILY PRN
Qty: 30 CAPSULE | Refills: 0 | Status: SHIPPED | OUTPATIENT
Start: 2025-01-13 | End: 2025-02-12

## 2025-01-13 RX ORDER — ALBUTEROL SULFATE 90 UG/1
INHALANT RESPIRATORY (INHALATION)
COMMUNITY
Start: 2025-01-07

## 2025-01-13 NOTE — PROGRESS NOTES
This dictation has been generated using Modal Fluency Dictation some phonetic errors may occur. Please contact author for clarification if needed.     1. Wheeze    2. Acute bronchitis, unspecified organism    3. Cough, unspecified type    4. Type 2 diabetes mellitus with hyperglycemia, without long-term current use of insulin    Other orders  -     methylPREDNISolone (MEDROL DOSEPACK) 4 mg tablet; use as directed  Dispense: 1 each; Refill: 0  -     benzonatate (TESSALON) 100 MG capsule; Take 1 capsule (100 mg total) by mouth 3 (three) times daily as needed for Cough.  Dispense: 30 capsule; Refill: 0       Bronchitis continue current therapies.  Add Tessalon for daytime cough controlled.  Medrol Dosepak as above.  Reviewed diabetes and hyperglycemia last A1c unfortunately was 10 in September.  Discussed diet modifications but will have to proceed with steroid burst given wheezing.  Continue albuterol.    No follow-ups on file.    ________________________________________________________________  ________________________________________________________________      No chief complaint on file.    History of present illness    This 69 y.o. presents today for complaint of   Coughing episodes and wheezy.  Patient notes utilization of albuterol and promethazine DM without control of cough.  Notes coughing to the point of getting short of breath.  Cough is very tight and he notes wheezing.  Patient did complete a Z-Hernandez.  He did see urgent care last week for that prescription promethazine DM and albuterol.  He had an injection of antibiotics per his report.  He has not had steroids per his report.  Review of systems   No fever chills body aches headache      Past Medical History:   Diagnosis Date    Diabetes mellitus, type 2     Hyperlipidemia        Past Surgical History:   Procedure Laterality Date    AV FISTULA REPAIR      HERNIA REPAIR      SPINE SURGERY  12/2006    C6/7 ACDF       Family History   Problem Relation Name  Age of Onset    Heart disease Father         Social History     Socioeconomic History    Marital status:    Tobacco Use    Smoking status: Never    Smokeless tobacco: Never   Substance and Sexual Activity    Alcohol use: No     Comment: 1-2 times per year    Drug use: No     Social Drivers of Health     Financial Resource Strain: Medium Risk (2025)    Overall Financial Resource Strain (CARDIA)     Difficulty of Paying Living Expenses: Somewhat hard   Food Insecurity: No Food Insecurity (2025)    Hunger Vital Sign     Worried About Running Out of Food in the Last Year: Never true     Ran Out of Food in the Last Year: Never true   Transportation Needs: No Transportation Needs (10/8/2024)    TRANSPORTATION NEEDS     Transportation : No   Physical Activity: Insufficiently Active (2025)    Exercise Vital Sign     Days of Exercise per Week: 2 days     Minutes of Exercise per Session: 20 min   Stress: No Stress Concern Present (2025)    [employer] - Occupational Stress Questionnaire     Feeling of Stress : Not at all   Housing Stability: Unknown (2025)    Housing Stability Vital Sign     Unable to Pay for Housing in the Last Year: No       Current Outpatient Medications   Medication Sig Dispense Refill    albuterol (PROVENTIL/VENTOLIN HFA) 90 mcg/actuation inhaler SMARTSI Puff(s) By Mouth Every 4-6 Hours      fluorometholone 0.1% (FML) 0.1 % DrpS       glipiZIDE 5 MG TR24 Take 1 tablet (5 mg total) by mouth daily with breakfast. 90 tablet 3    meloxicam (MOBIC) 15 MG tablet Take 1 tablet by mouth once daily 90 tablet 0    metFORMIN (GLUCOPHAGE-XR) 750 MG ER 24hr tablet Take 1 tablet by mouth once daily with breakfast 90 tablet 3    primidone (MYSOLINE) 50 MG Tab Take 2 tablets (100 mg total) by mouth 2 (two) times a day. 60 tablet 11    promethazine-dextromethorphan (PROMETHAZINE-DM) 6.25-15 mg/5 mL Syrp Take 5 mLs by mouth every 6 (six) hours as needed.       "rosuvastatin (CRESTOR) 10 MG tablet Take 1 tablet (10 mg total) by mouth once daily. 90 tablet 0    valsartan (DIOVAN) 40 MG tablet Take 1 tablet (40 mg total) by mouth once daily. 90 tablet 3    benzonatate (TESSALON) 100 MG capsule Take 1 capsule (100 mg total) by mouth 3 (three) times daily as needed for Cough. 30 capsule 0    methylPREDNISolone (MEDROL DOSEPACK) 4 mg tablet use as directed 1 each 0     No current facility-administered medications for this visit.       Review of patient's allergies indicates:  No Known Allergies    Physical examination  Vitals Reviewed  /86 (BP Location: Right arm, Patient Position: Sitting)   Pulse 98   Temp 98.4 °F (36.9 °C) (Oral)   Ht 5' 8" (1.727 m)   Wt 96.6 kg (213 lb)   SpO2 97%   BMI 32.39 kg/m²  Body mass index is 32.39 kg/m².     BP Readings from Last 3 Encounters:   01/13/25 138/86   09/30/24 (!) 158/102   09/29/23 (!) 135/92       Wt Readings from Last 3 Encounters:   01/13/25 96.6 kg (213 lb)   09/30/24 94.1 kg (207 lb 8 oz)   09/29/23 88.5 kg (195 lb)     Gen. Well-dressed well-nourished   Skin warm dry and intact.  No rashes noted.  HEENT.  TM intact bilateral with normal light reflex.  No mastoid tenderness during percussion.  Nares patent bilateral.  Pharynx is unremarkable.  No maxillary or frontal sinus tenderness when percussed.    Neck is supple without adenopathy  Chest.  Respirations are even unlabored.   Scattered wheezing noted during auscultation and frequent coughing episodes.  Cardiac regular rate and rhythm.  No chest wall adenopathy noted.  Neuro. Awake alert oriented x4.  Normal judgment and cognition noted.  Extremities no clubbing cyanosis or edema noted.     Call or return to clinic prn if these symptoms worsen or fail to improve as anticipated.      "

## 2025-01-13 NOTE — TELEPHONE ENCOUNTER
----- Message from Cecily sent at 1/13/2025 11:23 AM CST -----   Radha the patients wife called. The patient went to Urgent Care last week . He is still very sick. The congestion is all in his chest now and he has a bad cough. He wants to be seen today. I offered an appointment tomorrow. She wants him seen today. He really wants to see Dr. Cheng. Radha's # [phone] GH

## 2025-01-15 RX ORDER — ROSUVASTATIN CALCIUM 10 MG/1
10 TABLET, COATED ORAL
Qty: 90 TABLET | Refills: 0 | Status: SHIPPED | OUTPATIENT
Start: 2025-01-15

## 2025-01-27 ENCOUNTER — TELEPHONE (OUTPATIENT)
Dept: FAMILY MEDICINE | Facility: CLINIC | Age: 70
End: 2025-01-27
Payer: COMMERCIAL

## 2025-01-27 DIAGNOSIS — J40 BRONCHITIS: Primary | ICD-10-CM

## 2025-01-27 RX ORDER — ALBUTEROL SULFATE 0.83 MG/ML
2.5 SOLUTION RESPIRATORY (INHALATION) EVERY 6 HOURS PRN
Qty: 100 EACH | Refills: 0 | Status: SHIPPED | OUTPATIENT
Start: 2025-01-27 | End: 2026-01-27

## 2025-01-27 NOTE — TELEPHONE ENCOUNTER
----- Message from Cecily sent at 1/27/2025  3:18 PM CST -----  The patient has been using his wife's nebulizer with Albuterol. Its been helping him with his bad congestion and cough. He wants to know can you order him one and the medication. [facility]. Please take [facility] out of the patients chart. Pt's # [phone] GH

## 2025-01-28 ENCOUNTER — TELEPHONE (OUTPATIENT)
Dept: FAMILY MEDICINE | Facility: CLINIC | Age: 70
End: 2025-01-28
Payer: COMMERCIAL

## 2025-01-28 NOTE — TELEPHONE ENCOUNTER
Patient is asking if you can put in a nebulizer machine order. His machine does not work properly.

## 2025-01-28 NOTE — TELEPHONE ENCOUNTER
----- Message from Madison sent at 1/28/2025  2:17 PM CST -----  Pt wife is calling to see where the nebulizer machine will be sent to.   [phone] [facility]

## 2025-01-28 NOTE — TELEPHONE ENCOUNTER
Bell sent this yesterday. Spoke to patient about nebulizer order. Advised patient that they can pick the order up or wait till tomorrow when Bell gets back to see if she sent it somewhere. Patient stated he would let his wife know to come pick it up tomorrow.

## 2025-01-29 ENCOUNTER — TELEPHONE (OUTPATIENT)
Dept: FAMILY MEDICINE | Facility: CLINIC | Age: 70
End: 2025-01-29
Payer: COMMERCIAL

## 2025-01-29 DIAGNOSIS — M19.90 ARTHRITIS: ICD-10-CM

## 2025-01-29 NOTE — TELEPHONE ENCOUNTER
----- Message from Mary sent at 1/29/2025  8:58 AM CST -----  Contact: Radha  Refill for nebulizer kit needs to be sent to [facility]. Fax #[phone]. Radha @[phone]

## 2025-01-30 RX ORDER — MELOXICAM 15 MG/1
15 TABLET ORAL
Qty: 90 TABLET | Refills: 0 | Status: SHIPPED | OUTPATIENT
Start: 2025-01-30

## 2025-01-31 ENCOUNTER — TELEPHONE (OUTPATIENT)
Dept: FAMILY MEDICINE | Facility: CLINIC | Age: 70
End: 2025-01-31
Payer: COMMERCIAL

## 2025-01-31 DIAGNOSIS — J40 BRONCHITIS: Primary | ICD-10-CM

## 2025-01-31 DIAGNOSIS — J20.9 ACUTE BRONCHITIS, UNSPECIFIED ORGANISM: ICD-10-CM

## 2025-01-31 NOTE — TELEPHONE ENCOUNTER
----- Message from Prudence sent at 1/30/2025  3:43 PM CST -----  Pt's wife called to get a breathing machine. She would like a prescription to be sent to [facility] [address] .  Pt stated that she has been trying for 2 weeks to get a breathing machine. They have the medicine but they need the machine.    [phone]

## 2025-01-31 NOTE — TELEPHONE ENCOUNTER
----- Message from Millicent sent at 1/30/2025  2:00 PM CST -----  Contact: Speedy  Wife speedy calling needs a prescription in order to receive patient nebulizer asking to receive this prescription today.   [phone]

## 2025-02-04 ENCOUNTER — TELEPHONE (OUTPATIENT)
Dept: FAMILY MEDICINE | Facility: CLINIC | Age: 70
End: 2025-02-04
Payer: COMMERCIAL

## 2025-02-04 NOTE — TELEPHONE ENCOUNTER
I did this last week, I think it is a DME thing, I think it needs to be printed out and brought to a DME company.     I have sent again and send possibly to the [facility] on [address].

## 2025-02-04 NOTE — TELEPHONE ENCOUNTER
Called the patient to see if he had received the nebulizer-received voicemail message was left for patient.  Faxed order to [facility]   Fax [phone]   [phone]

## 2025-02-17 ENCOUNTER — PATIENT MESSAGE (OUTPATIENT)
Dept: ADMINISTRATIVE | Facility: HOSPITAL | Age: 70
End: 2025-02-17
Payer: COMMERCIAL

## 2025-02-24 ENCOUNTER — HOSPITAL ENCOUNTER (OUTPATIENT)
Dept: RADIOLOGY | Facility: HOSPITAL | Age: 70
Discharge: HOME | End: 2025-02-24
Attending: NURSE PRACTITIONER
Payer: COMMERCIAL

## 2025-02-24 ENCOUNTER — OFFICE VISIT (OUTPATIENT)
Dept: FAMILY MEDICINE | Facility: CLINIC | Age: 70
End: 2025-02-24
Payer: COMMERCIAL

## 2025-02-24 VITALS
BODY MASS INDEX: 32.43 KG/M2 | WEIGHT: 214 LBS | OXYGEN SATURATION: 99 % | DIASTOLIC BLOOD PRESSURE: 86 MMHG | HEIGHT: 68 IN | TEMPERATURE: 98 F | SYSTOLIC BLOOD PRESSURE: 132 MMHG | HEART RATE: 101 BPM

## 2025-02-24 DIAGNOSIS — R05.9 COUGH, UNSPECIFIED TYPE: Primary | ICD-10-CM

## 2025-02-24 DIAGNOSIS — R06.02 SOB (SHORTNESS OF BREATH): ICD-10-CM

## 2025-02-24 DIAGNOSIS — R05.9 COUGH, UNSPECIFIED TYPE: ICD-10-CM

## 2025-02-24 DIAGNOSIS — R06.2 WHEEZE: ICD-10-CM

## 2025-02-24 PROCEDURE — 3288F FALL RISK ASSESSMENT DOCD: CPT | Mod: CPTII,S$GLB,, | Performed by: NURSE PRACTITIONER

## 2025-02-24 PROCEDURE — 3079F DIAST BP 80-89 MM HG: CPT | Mod: CPTII,S$GLB,, | Performed by: NURSE PRACTITIONER

## 2025-02-24 PROCEDURE — 71250 CT THORAX DX C-: CPT | Mod: TC

## 2025-02-24 PROCEDURE — 71250 CT THORAX DX C-: CPT | Mod: 26,,, | Performed by: RADIOLOGY

## 2025-02-24 PROCEDURE — 1126F AMNT PAIN NOTED NONE PRSNT: CPT | Mod: CPTII,S$GLB,, | Performed by: NURSE PRACTITIONER

## 2025-02-24 PROCEDURE — 3008F BODY MASS INDEX DOCD: CPT | Mod: CPTII,S$GLB,, | Performed by: NURSE PRACTITIONER

## 2025-02-24 PROCEDURE — 3075F SYST BP GE 130 - 139MM HG: CPT | Mod: CPTII,S$GLB,, | Performed by: NURSE PRACTITIONER

## 2025-02-24 PROCEDURE — 1159F MED LIST DOCD IN RCRD: CPT | Mod: CPTII,S$GLB,, | Performed by: NURSE PRACTITIONER

## 2025-02-24 PROCEDURE — 1101F PT FALLS ASSESS-DOCD LE1/YR: CPT | Mod: CPTII,S$GLB,, | Performed by: NURSE PRACTITIONER

## 2025-02-24 PROCEDURE — 99215 OFFICE O/P EST HI 40 MIN: CPT | Mod: S$GLB,,, | Performed by: NURSE PRACTITIONER

## 2025-02-24 PROCEDURE — 99999 PR PBB SHADOW E&M-EST. PATIENT-LVL III: CPT | Mod: PBBFAC,,, | Performed by: NURSE PRACTITIONER

## 2025-02-24 RX ORDER — HYDROCODONE BITARTRATE AND HOMATROPINE METHYLBROMIDE ORAL SOLUTION 5; 1.5 MG/5ML; MG/5ML
5 LIQUID ORAL EVERY 4 HOURS PRN
Qty: 120 ML | Refills: 0 | Status: SHIPPED | OUTPATIENT
Start: 2025-02-24

## 2025-02-24 RX ORDER — ALBUTEROL SULFATE 90 UG/1
2 INHALANT RESPIRATORY (INHALATION) EVERY 4 HOURS PRN
Qty: 8 G | Refills: 2 | Status: SHIPPED | OUTPATIENT
Start: 2025-02-24

## 2025-02-24 NOTE — PROGRESS NOTES
This dictation has been generated using Modal Fluency Dictation some phonetic errors may occur. Please contact author for clarification if needed.     1. Cough, unspecified type  -     CT Chest Without Contrast; Future; Expected date: 02/24/2025  -     hydrocodone-homatropine 5-1.5 mg/5 ml (HYCODAN) 5-1.5 mg/5 mL Syrp; Take 5 mLs by mouth every 4 (four) hours as needed (night time cough).  Dispense: 120 mL; Refill: 0    2. SOB (shortness of breath)    3. Wheeze  -     hydrocodone-homatropine 5-1.5 mg/5 ml (HYCODAN) 5-1.5 mg/5 mL Syrp; Take 5 mLs by mouth every 4 (four) hours as needed (night time cough).  Dispense: 120 mL; Refill: 0    Other orders  -     ipratropium (ATROVENT HFA) 17 mcg/actuation inhaler; Inhale 2 puffs into the lungs every 6 (six) hours. Rescue  Dispense: 12.9 g; Refill: 2  -     albuterol (PROVENTIL/VENTOLIN HFA) 90 mcg/actuation inhaler; Inhale 2 puffs into the lungs every 4 (four) hours as needed for Wheezing. Rescue  Dispense: 8 g; Refill: 2         Cough short of breath wheeze unclear etiology.  Cough present since December nonproductive no hemoptysis.  Check CT.  Chest x-ray report from [facility] within normal limits.  Add ipratropium and hydrocodone cough med.  Needs cough control for CT test.  Risk of somnolence discussed with patient. Do not drive or operate machinery while taking medication. Do not engage in task that require mental alertness.       No follow-ups on file.    ________________________________________________________________  ________________________________________________________________      Chief Complaint   Patient presents with    Follow-up     History of present illness    This 69 y.o. presents today for complaint of   Following up cough wheeze short of breath.  Last office visit with me was January 13th.  Continues to have cough symptoms despite utilization of multiple meds.  Prior to visit with me went to [facility] and chest x-ray  "was read as no acute cardiopulmonary pathology.  Continues to have coughing and wheezing.  Does note some shortness a breath.  Denies chest pain.  Feels like he has congestion in his chest.      Past Medical History:   Diagnosis Date    Diabetes mellitus, type 2     Hyperlipidemia        Past Surgical History:   Procedure Laterality Date    AV FISTULA REPAIR      HERNIA REPAIR      SPINE SURGERY  12/2006    C6/7 ACDF       Family History   Problem Relation Name Age of Onset    Heart disease Father         Social History     Socioeconomic History    Marital status:    Tobacco Use    Smoking status: Never    Smokeless tobacco: Never   Substance and Sexual Activity    Alcohol use: No     Comment: 1-2 times per year    Drug use: No     Social Drivers of Health     Financial Resource Strain: Medium Risk (1/13/2025)    Overall Financial Resource Strain (CARDIA)     Difficulty of Paying Living Expenses: Somewhat hard   Food Insecurity: No Food Insecurity (1/13/2025)    Hunger Vital Sign     Worried About Running Out of Food in the Last Year: Never true     Ran Out of Food in the Last Year: Never true   Transportation Needs: No Transportation Needs (10/8/2024)    TRANSPORTATION NEEDS     Transportation : No   Physical Activity: Insufficiently Active (1/13/2025)    Exercise Vital Sign     Days of Exercise per Week: 2 days     Minutes of Exercise per Session: 20 min   Stress: No Stress Concern Present (1/13/2025)    [employer] [place] - Occupational Stress Questionnaire     Feeling of Stress : Not at all   Housing Stability: Unknown (1/13/2025)    Housing Stability Vital Sign     Unable to Pay for Housing in the Last Year: No       Current Medications[1]    Review of patient's allergies indicates:  No Known Allergies    Physical examination  Vitals Reviewed  /86   Pulse 101   Temp 98.1 °F (36.7 °C) (Oral)   Ht 5' 8" (1.727 m)   Wt 97.1 kg (214 lb)   SpO2 99%   BMI 32.54 kg/m²  Body " mass index is 32.54 kg/m².     BP Readings from Last 3 Encounters:   02/24/25 132/86   01/13/25 138/86   09/30/24 (!) 158/102       Wt Readings from Last 3 Encounters:   02/24/25 97.1 kg (214 lb)   01/13/25 96.6 kg (213 lb)   09/30/24 94.1 kg (207 lb 8 oz)     Gen. Well-dressed well-nourished   Skin warm dry and intact.  No rashes noted.  Neck is supple without adenopathy  Chest.  Respirations are even unlabored.    Scattered rhonchi and wheeze noted during auscultation.  Cardiac regular rate and rhythm.  No chest wall adenopathy noted.  Neuro. Awake alert oriented x4.  Normal judgment and cognition noted.  Extremities no clubbing cyanosis or edema noted.     Call or return to clinic prn if these symptoms worsen or fail to improve as anticipated.           [1]   Current Outpatient Medications   Medication Sig Dispense Refill    albuterol (PROVENTIL) 2.5 mg /3 mL (0.083 %) nebulizer solution Take 3 mLs (2.5 mg total) by nebulization every 6 (six) hours as needed for Wheezing. Rescue 100 each 0    glipiZIDE 5 MG TR24 Take 1 tablet (5 mg total) by mouth daily with breakfast. 90 tablet 3    meloxicam (MOBIC) 15 MG tablet Take 1 tablet by mouth once daily 90 tablet 0    metFORMIN (GLUCOPHAGE-XR) 750 MG ER 24hr tablet Take 1 tablet by mouth once daily with breakfast 90 tablet 3    primidone (MYSOLINE) 50 MG Tab Take 2 tablets (100 mg total) by mouth 2 (two) times a day. 60 tablet 11    rosuvastatin (CRESTOR) 10 MG tablet TAKE 1 TABLET(10 MG) BY MOUTH DAILY 90 tablet 0    valsartan (DIOVAN) 40 MG tablet Take 1 tablet (40 mg total) by mouth once daily. 90 tablet 3    albuterol (PROVENTIL/VENTOLIN HFA) 90 mcg/actuation inhaler Inhale 2 puffs into the lungs every 4 (four) hours as needed for Wheezing. Rescue 8 g 2    fluorometholone 0.1% (FML) 0.1 % DrpS       hydrocodone-homatropine 5-1.5 mg/5 ml (HYCODAN) 5-1.5 mg/5 mL Syrp Take 5 mLs by mouth every 4 (four) hours as needed (night time cough). 120 mL 0    ipratropium  (ATROVENT HFA) 17 mcg/actuation inhaler Inhale 2 puffs into the lungs every 6 (six) hours. Rescue 12.9 g 2     No current facility-administered medications for this visit.

## 2025-02-25 ENCOUNTER — RESULTS FOLLOW-UP (OUTPATIENT)
Dept: FAMILY MEDICINE | Facility: CLINIC | Age: 70
End: 2025-02-25

## 2025-02-25 ENCOUNTER — TELEPHONE (OUTPATIENT)
Dept: FAMILY MEDICINE | Facility: CLINIC | Age: 70
End: 2025-02-25
Payer: COMMERCIAL

## 2025-02-25 DIAGNOSIS — R91.1 SOLITARY PULMONARY NODULE: Primary | ICD-10-CM

## 2025-02-25 DIAGNOSIS — R05.2 SUBACUTE COUGH: ICD-10-CM

## 2025-02-25 NOTE — TELEPHONE ENCOUNTER
Please advise CT chest scan and the next step to what the patient should do.  Patients spouse also stated the pharmacy did not have the cough syrup and will not have it until Friday.

## 2025-02-25 NOTE — TELEPHONE ENCOUNTER
I just released the results With a discussion they can look at the results and send me any questions.   The cough medication was for the CT.   Apparently The cough was quite it enough for him to be able to complete the CT.

## 2025-02-25 NOTE — TELEPHONE ENCOUNTER
----- Message from Millicent sent at 2/25/2025  8:21 AM CST -----  Wife Radha calling about patient receiving results and asking for a nurse to assist with these results. [phone]

## 2025-02-25 NOTE — TELEPHONE ENCOUNTER
Left message on voicemail advising that the test results are available on portal.and to message or call if any further questions.

## 2025-02-26 ENCOUNTER — TELEPHONE (OUTPATIENT)
Dept: FAMILY MEDICINE | Facility: CLINIC | Age: 70
End: 2025-02-26
Payer: COMMERCIAL

## 2025-02-26 NOTE — TELEPHONE ENCOUNTER
Talked to patient. Gave test results to  patient and  wife they  would like to know if they need to follow up with you or will there need to get a referral to a specialist? Please advise.

## 2025-02-26 NOTE — TELEPHONE ENCOUNTER
----- Message from Nurse Luu sent at 2/26/2025  8:37 AM CST -----    ----- Message -----  From: Cecily Mathew  Sent: 2/25/2025   5:00 PM CST  To: Eddy Hernández Staff     4:29 The patients wife called and said she missed a call on her . Radha's # [phone] GH

## 2025-02-27 ENCOUNTER — TELEPHONE (OUTPATIENT)
Dept: FAMILY MEDICINE | Facility: CLINIC | Age: 70
End: 2025-02-27
Payer: COMMERCIAL

## 2025-02-27 NOTE — TELEPHONE ENCOUNTER
----- Message from Med Assistant Vicente sent at 2/27/2025  8:08 AM CST -----  Patients wife is calling stating patient still has something going on and cannot wait another two months for another appointment.  Please call to discuss, feels patient may need to see a specialist.Pts Wife: [phone]

## 2025-02-27 NOTE — TELEPHONE ENCOUNTER
Patient states his cough is bad today, offered patient appointment for tomorrow, he declined. He advised he will just wait till 3/10 to see . call patient back, left message on voicemail that Renee is working at [place] on Saturday if he can go there.

## 2025-02-27 NOTE — TELEPHONE ENCOUNTER
How is the cough today?   If it and wheeze are not controlled I would like to see him again and listen to his chest again. I will also consider referral to specialists.

## 2025-02-27 NOTE — TELEPHONE ENCOUNTER
Returned call to patients wife- she stated the patient has had an ongoing cough since before Christmas. Patient states it feels like something sitting at the top o his chest. He he is wheezing and has chest tightness but no other symptoms. Patient was advised if the symptoms got worse with his breathing, if the chest tightness worsens or becomes pain or if he feels like he cannot breath he should report to the emergency room for evaluation. Verbal understanding was expressed.

## 2025-03-10 ENCOUNTER — PATIENT MESSAGE (OUTPATIENT)
Dept: ADMINISTRATIVE | Facility: HOSPITAL | Age: 70
End: 2025-03-10
Payer: COMMERCIAL

## 2025-03-10 ENCOUNTER — OFFICE VISIT (OUTPATIENT)
Dept: FAMILY MEDICINE | Facility: CLINIC | Age: 70
End: 2025-03-10
Payer: COMMERCIAL

## 2025-03-10 VITALS
SYSTOLIC BLOOD PRESSURE: 143 MMHG | HEIGHT: 68 IN | DIASTOLIC BLOOD PRESSURE: 92 MMHG | HEART RATE: 96 BPM | OXYGEN SATURATION: 96 % | BODY MASS INDEX: 32.58 KG/M2 | WEIGHT: 215 LBS

## 2025-03-10 DIAGNOSIS — R91.8 PULMONARY NODULES: ICD-10-CM

## 2025-03-10 DIAGNOSIS — E11.65 TYPE 2 DIABETES MELLITUS WITH HYPERGLYCEMIA, WITHOUT LONG-TERM CURRENT USE OF INSULIN: ICD-10-CM

## 2025-03-10 DIAGNOSIS — J06.9 UPPER RESPIRATORY TRACT INFECTION, UNSPECIFIED TYPE: ICD-10-CM

## 2025-03-10 DIAGNOSIS — R06.2 WHEEZING: Primary | ICD-10-CM

## 2025-03-10 PROCEDURE — 1101F PT FALLS ASSESS-DOCD LE1/YR: CPT | Mod: CPTII,S$GLB,, | Performed by: FAMILY MEDICINE

## 2025-03-10 PROCEDURE — 99999 PR PBB SHADOW E&M-EST. PATIENT-LVL IV: CPT | Mod: PBBFAC,,, | Performed by: FAMILY MEDICINE

## 2025-03-10 PROCEDURE — 3008F BODY MASS INDEX DOCD: CPT | Mod: CPTII,S$GLB,, | Performed by: FAMILY MEDICINE

## 2025-03-10 PROCEDURE — 3288F FALL RISK ASSESSMENT DOCD: CPT | Mod: CPTII,S$GLB,, | Performed by: FAMILY MEDICINE

## 2025-03-10 PROCEDURE — 3080F DIAST BP >= 90 MM HG: CPT | Mod: CPTII,S$GLB,, | Performed by: FAMILY MEDICINE

## 2025-03-10 PROCEDURE — 99214 OFFICE O/P EST MOD 30 MIN: CPT | Mod: S$GLB,,, | Performed by: FAMILY MEDICINE

## 2025-03-10 PROCEDURE — 3077F SYST BP >= 140 MM HG: CPT | Mod: CPTII,S$GLB,, | Performed by: FAMILY MEDICINE

## 2025-03-10 PROCEDURE — 1159F MED LIST DOCD IN RCRD: CPT | Mod: CPTII,S$GLB,, | Performed by: FAMILY MEDICINE

## 2025-03-10 PROCEDURE — 1125F AMNT PAIN NOTED PAIN PRSNT: CPT | Mod: CPTII,S$GLB,, | Performed by: FAMILY MEDICINE

## 2025-03-10 RX ORDER — AZELASTINE 1 MG/ML
1 SPRAY, METERED NASAL 2 TIMES DAILY
Qty: 30 ML | Refills: 5 | Status: SHIPPED | OUTPATIENT
Start: 2025-03-10 | End: 2026-03-10

## 2025-03-10 RX ORDER — CODEINE PHOSPHATE AND GUAIFENESIN 10; 100 MG/5ML; MG/5ML
5 SOLUTION ORAL NIGHTLY
Qty: 118 ML | Refills: 0 | Status: SHIPPED | OUTPATIENT
Start: 2025-03-10 | End: 2025-04-03

## 2025-03-10 RX ORDER — MONTELUKAST SODIUM 10 MG/1
10 TABLET ORAL NIGHTLY
Qty: 30 TABLET | Refills: 0 | Status: SHIPPED | OUTPATIENT
Start: 2025-03-10 | End: 2025-04-09

## 2025-03-10 RX ORDER — ALBUTEROL SULFATE 90 UG/1
2 INHALANT RESPIRATORY (INHALATION) EVERY 4 HOURS PRN
Qty: 8 G | Refills: 2 | Status: SHIPPED | OUTPATIENT
Start: 2025-03-10

## 2025-03-10 RX ORDER — BUDESONIDE, GLYCOPYRROLATE, AND FORMOTEROL FUMARATE 160; 9; 4.8 UG/1; UG/1; UG/1
2 AEROSOL, METERED RESPIRATORY (INHALATION) 2 TIMES DAILY
Qty: 10.7 G | Refills: 3 | Status: SHIPPED | OUTPATIENT
Start: 2025-03-10

## 2025-03-10 RX ORDER — ALBUTEROL SULFATE 0.83 MG/ML
2.5 SOLUTION RESPIRATORY (INHALATION) EVERY 6 HOURS PRN
Qty: 100 EACH | Refills: 0 | Status: SHIPPED | OUTPATIENT
Start: 2025-03-10 | End: 2026-03-10

## 2025-03-10 RX ORDER — PREDNISONE 20 MG/1
20 TABLET ORAL DAILY
Qty: 7 TABLET | Refills: 0 | Status: SHIPPED | OUTPATIENT
Start: 2025-03-10

## 2025-03-10 RX ORDER — FLUTICASONE PROPIONATE 50 MCG
1 SPRAY, SUSPENSION (ML) NASAL DAILY
Qty: 16 G | Refills: 0 | Status: SHIPPED | OUTPATIENT
Start: 2025-03-10

## 2025-03-10 RX ORDER — CETIRIZINE HYDROCHLORIDE 10 MG/1
10 TABLET ORAL DAILY
Qty: 90 TABLET | Refills: 0 | Status: SHIPPED | OUTPATIENT
Start: 2025-03-10 | End: 2026-03-10

## 2025-03-10 NOTE — PROGRESS NOTES
SUBJECTIVE:    Patient ID: Sushil Lucas is a 69 y.o. male.    Chief Complaint: Cough  69-year-old male pt is traditionally non-compliant with his chronic medical issues and routine health maintenance. Pt today is here complaining of chronic upper respiratory issues for the past 2.5 months, he states that his symptoms started in January, he has been seen multiple times, he has been treated with Antibiotics, and oral steroids, without resolution. Pt states that he is wheezing and the wheezing and cough has not improved despite multiple treatments and different inhalers.    Pt denies any history of asthma, no hx of chronic smoking      He did have a chest CT:  Multiple subcentimeter noncalcified nodules in both lungs measuring up to 7 mm, nonspecific. A follow-up noncontrast chest CT in 3-6 months is recommended, per Fleischner Society guidelines.             Significant past medical history  Arthritis: Mobic 15  Hyperlipidemia atorvastatin 20 mg   Diabetes type 2 metformin 500 mg, glipizide 5mg  Hypertension:  propanolol 80 mg, Valsartan 40mg      Specialists     Smoke:  None  ETOH:  Exercise:      Diabetes  He presents for his follow-up diabetic visit. He has type 2 diabetes mellitus. Disease course: will need to get A1C. Pertinent negatives for diabetes include no chest pain and no fatigue.   Hypertension  This is a chronic problem. The current episode started more than 1 year ago. The problem is unchanged. The problem is uncontrolled. Pertinent negatives include no chest pain or palpitations. Risk factors for coronary artery disease include diabetes mellitus and dyslipidemia. Past treatments include angiotensin blockers and beta blockers. The current treatment provides moderate improvement. Compliance problems include exercise and diet.    Hyperlipidemia  This is a chronic problem. The current episode started more than 1 year ago. Exacerbating diseases include diabetes. Pertinent negatives include no  "chest pain.   Cough  This is a chronic problem. The current episode started more than 1 month ago. The problem has been unchanged. The problem occurs every few minutes. The cough is Non-productive. Associated symptoms include wheezing. Pertinent negatives include no chest pain, postnasal drip or rhinorrhea. Nothing aggravates the symptoms. He has tried a beta-agonist inhaler, ipratropium inhaler and oral steroids for the symptoms. The treatment provided no relief.   Wheezing   This is a chronic problem. The current episode started more than 1 month ago. The problem occurs daily. The problem has been unchanged. Associated symptoms include coughing. Pertinent negatives include no abdominal pain, chest pain or rhinorrhea. The symptoms are aggravated by exercise. He has tried beta agonist inhalers, leukotriene antagonists, oral steroids and prescription cough suppressant for the symptoms.         Past Medical History:   Diagnosis Date    Diabetes mellitus, type 2     Hyperlipidemia      Social History[1]  Past Surgical History:   Procedure Laterality Date    AV FISTULA REPAIR      HERNIA REPAIR      SPINE SURGERY  12/2006    C6/7 ACDF     Family History   Problem Relation Name Age of Onset    Heart disease Father       Current Medications[2]  Review of patient's allergies indicates:  No Known Allergies    Review of Systems   Constitutional:  Negative for activity change, appetite change, diaphoresis, fatigue and unexpected weight change.   HENT:  Negative for congestion, postnasal drip, rhinorrhea and sinus pain.    Respiratory:  Positive for cough and wheezing.    Cardiovascular:  Negative for chest pain and palpitations.   Gastrointestinal:  Negative for abdominal distention, abdominal pain, anal bleeding and blood in stool.          Blood pressure (!) 143/92, pulse 96, height 5' 8" (1.727 m), weight 97.5 kg (215 lb), SpO2 96%. Body mass index is 32.69 kg/m².   Objective:      Physical Exam  Vitals (hypertensive) " reviewed.   Constitutional:       General: He is not in acute distress.     Appearance: Normal appearance. He is obese. He is not ill-appearing or toxic-appearing.   HENT:      Head: Normocephalic and atraumatic.   Cardiovascular:      Rate and Rhythm: Normal rate and regular rhythm.      Heart sounds: Normal heart sounds.   Pulmonary:      Effort: No respiratory distress.      Breath sounds: Wheezing present.      Comments: Bilateral expiratory wheezing  Neurological:      Mental Status: He is alert.         Assessment:       1. Wheezing    2. Pulmonary nodules    3. Type 2 diabetes mellitus with hyperglycemia, without long-term current use of insulin    4. Upper respiratory tract infection, unspecified type         Plan:           Wheezing  -     azelastine (ASTELIN) 137 mcg (0.1 %) nasal spray; 1 spray (137 mcg total) by Nasal route 2 (two) times daily.  Dispense: 30 mL; Refill: 5  -     fluticasone propionate (FLONASE) 50 mcg/actuation nasal spray; 1 spray (50 mcg total) by Each Nostril route once daily.  Dispense: 16 g; Refill: 0  -     montelukast (SINGULAIR) 10 mg tablet; Take 1 tablet (10 mg total) by mouth every evening.  Dispense: 30 tablet; Refill: 0  -     cetirizine (ZYRTEC) 10 MG tablet; Take 1 tablet (10 mg total) by mouth once daily.  Dispense: 90 tablet; Refill: 0  -     budesonide-glycopyr-formoterol (BREZTRI AEROSPHERE) 160-9-4.8 mcg/actuation HFAA; Inhale 2 puffs into the lungs 2 (two) times a day.  Dispense: 10.7 g; Refill: 3  -     guaiFENesin-codeine 100-10 mg/5 ml (TUSSI-ORGANIDIN NR)  mg/5 mL syrup; Take 5 mLs by mouth nightly. for 24 days  Dispense: 118 mL; Refill: 0  -     predniSONE (DELTASONE) 20 MG tablet; Take 1 tablet (20 mg total) by mouth once daily.  Dispense: 7 tablet; Refill: 0  -     albuterol (PROVENTIL) 2.5 mg /3 mL (0.083 %) nebulizer solution; Take 3 mLs (2.5 mg total) by nebulization every 6 (six) hours as needed for Wheezing. Rescue  Dispense: 100 each; Refill: 0  -      albuterol (PROVENTIL/VENTOLIN HFA) 90 mcg/actuation inhaler; Inhale 2 puffs into the lungs every 4 (four) hours as needed for Wheezing. Rescue  Dispense: 8 g; Refill: 2  Pt is presenting with sympyoms consistent with Asthma, he is wheezing, and a dry non-productive cough, his symptoms are worse at night and with activity. Will consider a PFT after this 3 months of symptoms resolve. Will treat with oral and inhailed steroids, will treat his upper respiratory symptoms of nasal congestion and cough with medications for allergies, will get repeat Chest CT.    Pulmonary nodules  -     CT Chest Without Contrast; Future; Expected date: 03/10/2025    Type 2 diabetes mellitus with hyperglycemia, without long-term current use of insulin  -     Hemoglobin A1C; Future; Expected date: 03/10/2025  -     Comprehensive Metabolic Panel; Future; Expected date: 03/10/2025  -     Lipid Panel; Future; Expected date: 03/10/2025    Upper respiratory tract infection, unspecified type  -     azelastine (ASTELIN) 137 mcg (0.1 %) nasal spray; 1 spray (137 mcg total) by Nasal route 2 (two) times daily.  Dispense: 30 mL; Refill: 5  -     fluticasone propionate (FLONASE) 50 mcg/actuation nasal spray; 1 spray (50 mcg total) by Each Nostril route once daily.  Dispense: 16 g; Refill: 0  -     montelukast (SINGULAIR) 10 mg tablet; Take 1 tablet (10 mg total) by mouth every evening.  Dispense: 30 tablet; Refill: 0  -     cetirizine (ZYRTEC) 10 MG tablet; Take 1 tablet (10 mg total) by mouth once daily.  Dispense: 90 tablet; Refill: 0  -     budesonide-glycopyr-formoterol (BREZTRI AEROSPHERE) 160-9-4.8 mcg/actuation HFAA; Inhale 2 puffs into the lungs 2 (two) times a day.  Dispense: 10.7 g; Refill: 3  -     predniSONE (DELTASONE) 20 MG tablet; Take 1 tablet (20 mg total) by mouth once daily.  Dispense: 7 tablet; Refill: 0  -     albuterol (PROVENTIL) 2.5 mg /3 mL (0.083 %) nebulizer solution; Take 3 mLs (2.5 mg total) by nebulization every 6 (six)  hours as needed for Wheezing. Rescue  Dispense: 100 each; Refill: 0  -     albuterol (PROVENTIL/VENTOLIN HFA) 90 mcg/actuation inhaler; Inhale 2 puffs into the lungs every 4 (four) hours as needed for Wheezing. Rescue  Dispense: 8 g; Refill: 2                               [1]   Social History  Socioeconomic History    Marital status:    Tobacco Use    Smoking status: Never    Smokeless tobacco: Never   Substance and Sexual Activity    Alcohol use: No     Comment: 1-2 times per year    Drug use: No     Social Drivers of Health     Financial Resource Strain: Medium Risk (3/7/2025)    Overall Financial Resource Strain (CARDIA)     Difficulty of Paying Living Expenses: Somewhat hard   Food Insecurity: No Food Insecurity (3/7/2025)    Hunger Vital Sign     Worried About Running Out of Food in the Last Year: Never true     Ran Out of Food in the Last Year: Never true   Transportation Needs: No Transportation Needs (3/7/2025)    PRAPARE - Transportation     Lack of Transportation (Medical): No     Lack of Transportation (Non-Medical): No   Physical Activity: Sufficiently Active (3/7/2025)    Exercise Vital Sign     Days of Exercise per Week: 2 days     Minutes of Exercise per Session: 120 min   Recent Concern: Physical Activity - Insufficiently Active (1/13/2025)    Exercise Vital Sign     Days of Exercise per Week: 2 days     Minutes of Exercise per Session: 20 min   Stress: No Stress Concern Present (3/7/2025)    [employer] - Occupational Stress Questionnaire     Feeling of Stress : Not at all   Housing Stability: Low Risk  (3/7/2025)    Housing Stability Vital Sign     Unable to Pay for Housing in the Last Year: No     Number of Times Moved in the Last Year: 0     Homeless in the Last Year: No   [2]   Current Outpatient Medications   Medication Sig Dispense Refill    glipiZIDE 5 MG TR24 Take 1 tablet (5 mg total) by mouth daily with breakfast. 90 tablet 3    meloxicam (MOBIC) 15 MG  tablet Take 1 tablet by mouth once daily 90 tablet 0    metFORMIN (GLUCOPHAGE-XR) 750 MG ER 24hr tablet Take 1 tablet by mouth once daily with breakfast 90 tablet 3    primidone (MYSOLINE) 50 MG Tab Take 2 tablets (100 mg total) by mouth 2 (two) times a day. 60 tablet 11    rosuvastatin (CRESTOR) 10 MG tablet TAKE 1 TABLET(10 MG) BY MOUTH DAILY 90 tablet 0    valsartan (DIOVAN) 40 MG tablet Take 1 tablet (40 mg total) by mouth once daily. 90 tablet 3    albuterol (PROVENTIL) 2.5 mg /3 mL (0.083 %) nebulizer solution Take 3 mLs (2.5 mg total) by nebulization every 6 (six) hours as needed for Wheezing. Rescue 100 each 0    albuterol (PROVENTIL/VENTOLIN HFA) 90 mcg/actuation inhaler Inhale 2 puffs into the lungs every 4 (four) hours as needed for Wheezing. Rescue 8 g 2    azelastine (ASTELIN) 137 mcg (0.1 %) nasal spray 1 spray (137 mcg total) by Nasal route 2 (two) times daily. 30 mL 5    budesonide-glycopyr-formoterol (BREZTRI AEROSPHERE) 160-9-4.8 mcg/actuation HFAA Inhale 2 puffs into the lungs 2 (two) times a day. 10.7 g 3    cetirizine (ZYRTEC) 10 MG tablet Take 1 tablet (10 mg total) by mouth once daily. 90 tablet 0    fluticasone propionate (FLONASE) 50 mcg/actuation nasal spray 1 spray (50 mcg total) by Each Nostril route once daily. 16 g 0    guaiFENesin-codeine 100-10 mg/5 ml (TUSSI-ORGANIDIN NR)  mg/5 mL syrup Take 5 mLs by mouth nightly. for 24 days 118 mL 0    montelukast (SINGULAIR) 10 mg tablet Take 1 tablet (10 mg total) by mouth every evening. 30 tablet 0    predniSONE (DELTASONE) 20 MG tablet Take 1 tablet (20 mg total) by mouth once daily. 7 tablet 0     No current facility-administered medications for this visit.

## 2025-04-02 ENCOUNTER — PATIENT MESSAGE (OUTPATIENT)
Dept: ADMINISTRATIVE | Facility: HOSPITAL | Age: 70
End: 2025-04-02
Payer: COMMERCIAL

## 2025-04-11 DIAGNOSIS — J06.9 UPPER RESPIRATORY TRACT INFECTION, UNSPECIFIED TYPE: ICD-10-CM

## 2025-04-11 DIAGNOSIS — R06.2 WHEEZING: ICD-10-CM

## 2025-04-11 NOTE — TELEPHONE ENCOUNTER
Refill Routing Note   Medication(s) are not appropriate for processing by [facility] for the following reason(s):        New or recently adjusted medication    [facility] action(s):  Defer     Requires labs : Yes             Appointments  past 12m or future 3m with PCP    Date Provider   Last Visit   3/10/2025 Edgar Kam MD   Next Visit   5/12/2025 Edgar Kam MD   ED visits in past 90 days: 0        Note composed:8:44 AM 04/11/2025

## 2025-04-11 NOTE — TELEPHONE ENCOUNTER
Care Due:                  Date            Visit Type   Department     Provider  --------------------------------------------------------------------------------                                SAM - [facility]      [address]  Last Visit: 03-      [facility]                              EP - [facility] [address]  Next Visit: 05-      [facility]                                                            Last  Test          Frequency    Reason                     Performed    Due Date  --------------------------------------------------------------------------------    CBC.........  12 months..  meloxicam................  Not Found    Overdue    HBA1C.......  6 months...  glipiZIDE, metFORMIN.....  09- 03-    [facility] Embedded Care Due Messages. Reference number: [id number].   4/11/2025 7:39:42 AM CDT

## 2025-04-13 RX ORDER — MONTELUKAST SODIUM 10 MG/1
10 TABLET ORAL NIGHTLY
Qty: 90 TABLET | Refills: 3 | Status: SHIPPED | OUTPATIENT
Start: 2025-04-13

## 2025-04-16 DIAGNOSIS — R06.2 WHEEZING: ICD-10-CM

## 2025-04-16 DIAGNOSIS — J06.9 UPPER RESPIRATORY TRACT INFECTION, UNSPECIFIED TYPE: ICD-10-CM

## 2025-04-16 RX ORDER — FLUTICASONE PROPIONATE 50 MCG
1 SPRAY, SUSPENSION (ML) NASAL
Qty: 32 G | Refills: 3 | Status: SHIPPED | OUTPATIENT
Start: 2025-04-16

## 2025-04-16 NOTE — TELEPHONE ENCOUNTER
Refill Routing Note   Medication(s) are not appropriate for processing by [facility] for the following reason(s):        New or recently adjusted medication    [facility] action(s):  Defer               Appointments  past 12m or future 3m with PCP    Date Provider   Last Visit   3/10/2025 Edgar Kam MD   Next Visit   5/12/2025 Edgar Kam MD   ED visits in past 90 days: 0        Note composed:12:46 PM 04/16/2025

## 2025-04-16 NOTE — TELEPHONE ENCOUNTER
No care due was identified.  Health [facility] Embedded Care Due Messages. Reference number: [id number].   4/16/2025 10:17:37 AM CDT

## 2025-05-07 RX ORDER — ROSUVASTATIN CALCIUM 10 MG/1
10 TABLET, COATED ORAL
Qty: 90 TABLET | Refills: 1 | Status: SHIPPED | OUTPATIENT
Start: 2025-05-07

## 2025-05-07 NOTE — TELEPHONE ENCOUNTER
No care due was identified.  Health [facility] Embedded Care Due Messages. Reference number: [id number].   5/07/2025 9:05:47 AM CDT

## 2025-05-07 NOTE — TELEPHONE ENCOUNTER
Refill Decision Note   Sushil Lucas  is requesting a refill authorization.  Brief Assessment and Rationale for Refill:  Approve     Medication Therapy Plan:         Comments:     Note composed:10:54 AM 05/07/2025

## 2025-05-12 ENCOUNTER — OFFICE VISIT (OUTPATIENT)
Dept: FAMILY MEDICINE | Facility: CLINIC | Age: 70
End: 2025-05-12
Payer: COMMERCIAL

## 2025-05-12 VITALS
BODY MASS INDEX: 33.15 KG/M2 | OXYGEN SATURATION: 97 % | HEART RATE: 83 BPM | HEIGHT: 68 IN | DIASTOLIC BLOOD PRESSURE: 103 MMHG | WEIGHT: 218.69 LBS | SYSTOLIC BLOOD PRESSURE: 157 MMHG

## 2025-05-12 DIAGNOSIS — G25.0 ESSENTIAL TREMOR: ICD-10-CM

## 2025-05-12 DIAGNOSIS — E78.49 OTHER HYPERLIPIDEMIA: ICD-10-CM

## 2025-05-12 DIAGNOSIS — E11.65 TYPE 2 DIABETES MELLITUS WITH HYPERGLYCEMIA, WITHOUT LONG-TERM CURRENT USE OF INSULIN: Primary | ICD-10-CM

## 2025-05-12 DIAGNOSIS — I10 PRIMARY HYPERTENSION: ICD-10-CM

## 2025-05-12 DIAGNOSIS — Z12.5 PROSTATE CANCER SCREENING: ICD-10-CM

## 2025-05-12 PROCEDURE — 1125F AMNT PAIN NOTED PAIN PRSNT: CPT | Mod: CPTII,S$GLB,, | Performed by: FAMILY MEDICINE

## 2025-05-12 PROCEDURE — G2211 COMPLEX E/M VISIT ADD ON: HCPCS | Mod: S$GLB,,, | Performed by: FAMILY MEDICINE

## 2025-05-12 PROCEDURE — 3008F BODY MASS INDEX DOCD: CPT | Mod: CPTII,S$GLB,, | Performed by: FAMILY MEDICINE

## 2025-05-12 PROCEDURE — 3077F SYST BP >= 140 MM HG: CPT | Mod: CPTII,S$GLB,, | Performed by: FAMILY MEDICINE

## 2025-05-12 PROCEDURE — 1159F MED LIST DOCD IN RCRD: CPT | Mod: CPTII,S$GLB,, | Performed by: FAMILY MEDICINE

## 2025-05-12 PROCEDURE — 3288F FALL RISK ASSESSMENT DOCD: CPT | Mod: CPTII,S$GLB,, | Performed by: FAMILY MEDICINE

## 2025-05-12 PROCEDURE — 1101F PT FALLS ASSESS-DOCD LE1/YR: CPT | Mod: CPTII,S$GLB,, | Performed by: FAMILY MEDICINE

## 2025-05-12 PROCEDURE — 3080F DIAST BP >= 90 MM HG: CPT | Mod: CPTII,S$GLB,, | Performed by: FAMILY MEDICINE

## 2025-05-12 PROCEDURE — 4010F ACE/ARB THERAPY RXD/TAKEN: CPT | Mod: CPTII,S$GLB,, | Performed by: FAMILY MEDICINE

## 2025-05-12 PROCEDURE — 99214 OFFICE O/P EST MOD 30 MIN: CPT | Mod: S$GLB,,, | Performed by: FAMILY MEDICINE

## 2025-05-12 PROCEDURE — 99999 PR PBB SHADOW E&M-EST. PATIENT-LVL IV: CPT | Mod: PBBFAC,,, | Performed by: FAMILY MEDICINE

## 2025-05-12 RX ORDER — VALSARTAN AND HYDROCHLOROTHIAZIDE 80; 12.5 MG/1; MG/1
1 TABLET, FILM COATED ORAL DAILY
Qty: 90 TABLET | Refills: 3 | Status: SHIPPED | OUTPATIENT
Start: 2025-05-12 | End: 2026-05-12

## 2025-05-12 RX ORDER — GLIPIZIDE 10 MG/1
10 TABLET ORAL
Qty: 180 TABLET | Refills: 3 | Status: SHIPPED | OUTPATIENT
Start: 2025-05-12 | End: 2026-05-12

## 2025-05-12 RX ORDER — PRIMIDONE 50 MG/1
TABLET ORAL
Qty: 150 TABLET | Refills: 11 | Status: SHIPPED | OUTPATIENT
Start: 2025-05-12

## 2025-05-12 RX ORDER — LANCETS
EACH MISCELLANEOUS
Qty: 100 EACH | Refills: 0 | Status: SHIPPED | OUTPATIENT
Start: 2025-05-12

## 2025-05-12 RX ORDER — INSULIN PUMP SYRINGE, 3 ML
EACH MISCELLANEOUS
Qty: 1 EACH | Refills: 0 | Status: SHIPPED | OUTPATIENT
Start: 2025-05-12 | End: 2026-05-12

## 2025-05-12 NOTE — PROGRESS NOTES
SUBJECTIVE:    Patient ID: Sushil Lucas is a 69 y.o. male.    Chief Complaint: Follow-up  69-year-old male pt is traditionally non-compliant with his chronic medical issues and routine health maintenance. Pt today is here to follow-up on his hypertension, hyperlipidemia, diabetes, essential tremor.    After last visit labs replaced for him to complete before this visit patient did not get these labs completed, he has not been checking his blood sugar at home.  Patient states he did not get labs secondary to cost.  His blood pressure today is not well controlled he states he does not feel bad he is not complaining any chest pain shortness of breath dyspnea on exertion or lower extremity swelling.  We discussed increasing his blood pressure medications from valsartan 40 mg to valsartan 80 mg with hydrochlorothiazide.    At last visit patient's hemoglobin A1c was 10 he agreed to start on glipizide and metformin  this is very unlikely to have controlled his A1c, but patient did not get labs completed prior to this visit will increase his glipizide he was instructed to make sure he takes his medication in the morning and he eats after taking.    Lipids patient is taking Crestor 10 mg do not have a lipid panel to determine if this is made any change in his lipid levels.    At last visit patient was started on primidone 50 mg twice a day he states it helps but he had not near goal he continues to have problems signing his name we discussed increasing to 100 mg in the morning, and 150 mg in the evening  .       After last visit patient was instructed to repeat his chest CT because of the abnormal chest CT he had patient did not get this completed due to cost.    He did have a chest CT:  Multiple subcentimeter noncalcified nodules in both lungs measuring up to 7 mm, nonspecific. A follow-up noncontrast chest CT in 3-6 months is recommended, per Fleischner Society guidelines.          Visit today included  increased complexity associated with the care of the episodic problem diabetes addressed and managing the longitudinal care of the patient due to the serious and/or complex managed problem(s) high blood pressure, hyperlipidemia, essential tremor.       Significant past medical history  Arthritis: Mobic 15  Hyperlipidemia atorvastatin 20 mg   Diabetes: Metformin 750mg  glipizide 10 mg  Hypertension: Valsartan 80 mg,      Specialists  Ophth: Dr Cornejo      Smoke:  None  ETOH:  Exercise:    Diabetes  He presents for his follow-up diabetic visit. He has type 2 diabetes mellitus. Disease course: Unknown. There are no hypoglycemic associated symptoms. Pertinent negatives for hypoglycemia include no headaches. There are no diabetic associated symptoms. Pertinent negatives for diabetes include no blurred vision, no chest pain and no fatigue. There are no hypoglycemic complications. Symptoms are stable. Risk factors for coronary artery disease include diabetes mellitus, dyslipidemia, hypertension, male sex and sedentary lifestyle. Current diabetic treatment includes oral agent (dual therapy).   Hypertension  This is a chronic problem. The current episode started more than 1 year ago. The problem is unchanged. The problem is uncontrolled. Pertinent negatives include no blurred vision, chest pain, headaches, orthopnea, palpitations, peripheral edema, PND or shortness of breath. Risk factors for coronary artery disease include diabetes mellitus, dyslipidemia, male gender and sedentary lifestyle. Past treatments include angiotensin blockers. The current treatment provides mild improvement. Compliance problems include exercise and diet.    Hyperlipidemia  This is a chronic problem. The current episode started more than 1 year ago. The problem is uncontrolled. Lipid results: Unknown. Exacerbating diseases include diabetes and obesity. Pertinent negatives include no chest pain or shortness of breath. Current antihyperlipidemic  "treatment includes statins. Improvement on treatment: Unknown.         Past Medical History:   Diagnosis Date    Diabetes mellitus, type 2     Hyperlipidemia      Social History[1]  Past Surgical History:   Procedure Laterality Date    AV FISTULA REPAIR      HERNIA REPAIR      SPINE SURGERY  12/2006    C6/7 ACDF     Family History   Problem Relation Name Age of Onset    Heart disease Father       Current Medications[2]  Review of patient's allergies indicates:  No Known Allergies    Review of Systems   Constitutional:  Negative for activity change, appetite change, diaphoresis, fatigue and unexpected weight change.   HENT:  Negative for congestion, postnasal drip, rhinorrhea and sinus pain.    Eyes:  Negative for blurred vision.   Respiratory:  Negative for cough, shortness of breath and wheezing.    Cardiovascular:  Negative for chest pain, palpitations, orthopnea and PND.   Gastrointestinal:  Negative for abdominal distention, abdominal pain, anal bleeding and blood in stool.   Genitourinary:  Negative for dysuria, flank pain, frequency and urgency.   Neurological:  Negative for headaches.          Blood pressure (!) 157/103, pulse 83, height 5' 8" (1.727 m), weight 99.2 kg (218 lb 11.1 oz), SpO2 97%. Body mass index is 33.25 kg/m².   Objective:      Physical Exam  Vitals (hypertensive) reviewed.   Constitutional:       General: He is not in acute distress.     Appearance: Normal appearance. He is obese. He is not ill-appearing or toxic-appearing.   HENT:      Head: Normocephalic and atraumatic.   Cardiovascular:      Rate and Rhythm: Normal rate and regular rhythm.      Heart sounds: Normal heart sounds.   Pulmonary:      Effort: No respiratory distress.      Breath sounds: No wheezing.   Skin:     General: Skin is warm and dry.      Capillary Refill: Capillary refill takes less than 2 seconds.   Neurological:      Mental Status: He is alert and oriented to person, place, and time.         Assessment:       1. " Type 2 diabetes mellitus with hyperglycemia, without long-term current use of insulin    2. Essential tremor    3. Primary hypertension    4. Prostate cancer screening    5. Other hyperlipidemia         Plan:           Type 2 diabetes mellitus with hyperglycemia, without long-term current use of insulin  -     glipiZIDE (GLUCOTROL) 10 MG tablet; Take 1 tablet (10 mg total) by mouth 2 (two) times daily before meals.  Dispense: 180 tablet; Refill: 3  -     blood-glucose meter kit; To check BG 1 times daily, to use with insurance preferred meter  Dispense: 1 each; Refill: 0  -     lancets Misc; To check BG 1 times daily, to use with insurance preferred meter  Dispense: 100 each; Refill: 0  -     blood sugar diagnostic Strp; To check BG 1 times daily, to use with insurance preferred meter  Dispense: 100 each; Refill: 0  -     Lipid Panel; Future; Expected date: 05/12/2025  -     Comprehensive Metabolic Panel; Future; Expected date: 05/12/2025  -     Hemoglobin A1C; Future; Expected date: 05/12/2025  Will have patient check his blood sugar 1st thing in the morning while fasting, will try to get A1c before his next visit, will increase his glipizide in the morning  Essential tremor  -     primidone (MYSOLINE) 50 MG Tab; Take 2 tablets in the morning and 3 tablets in the afternoon  Dispense: 150 tablet; Refill: 11  Will increase his primidone 200 mg in the morning 150 mg in the afternoon  Primary hypertension  -     valsartan-hydrochlorothiazide (DIOVAN-HCT) 80-12.5 mg per tablet; Take 1 tablet by mouth once daily.  Dispense: 90 tablet; Refill: 3  -     Lipid Panel; Future; Expected date: 05/12/2025  -     Comprehensive Metabolic Panel; Future; Expected date: 05/12/2025  -     Hemoglobin A1C; Future; Expected date: 05/12/2025  Blood pressures no where near being controlled will increase his valsartan 80 mg and add hydrochlorothiazide.  Prostate cancer screening  -     PSA, Screening; Future; Expected date:  05/12/2025    Other hyperlipidemia  -     Lipid Panel; Future; Expected date: 05/12/2025  Patient was started on Crestor did not get lipids completed unsure if this is being effective or not will get lipid panel before making changes.                             [1]   Social History  Socioeconomic History    Marital status:    Tobacco Use    Smoking status: Never    Smokeless tobacco: Never   Substance and Sexual Activity    Alcohol use: No     Comment: 1-2 times per year    Drug use: No     Social Drivers of Health     Financial Resource Strain: Medium Risk (3/7/2025)    Overall Financial Resource Strain (CARDIA)     Difficulty of Paying Living Expenses: Somewhat hard   Food Insecurity: No Food Insecurity (3/7/2025)    Hunger Vital Sign     Worried About Running Out of Food in the Last Year: Never true     Ran Out of Food in the Last Year: Never true   Transportation Needs: No Transportation Needs (3/7/2025)    PRAPARE - Transportation     Lack of Transportation (Medical): No     Lack of Transportation (Non-Medical): No   Physical Activity: Sufficiently Active (3/7/2025)    Exercise Vital Sign     Days of Exercise per Week: 2 days     Minutes of Exercise per Session: 120 min   Recent Concern: Physical Activity - Insufficiently Active (1/13/2025)    Exercise Vital Sign     Days of Exercise per Week: 2 days     Minutes of Exercise per Session: 20 min   Stress: No Stress Concern Present (3/7/2025)    [employer] - Occupational Stress Questionnaire     Feeling of Stress : Not at all   Housing Stability: Low Risk  (3/7/2025)    Housing Stability Vital Sign     Unable to Pay for Housing in the Last Year: No     Number of Times Moved in the Last Year: 0     Homeless in the Last Year: No   [2]   Current Outpatient Medications   Medication Sig Dispense Refill    albuterol (PROVENTIL) 2.5 mg /3 mL (0.083 %) nebulizer solution Take 3 mLs (2.5 mg total) by nebulization every 6 (six) hours as  needed for Wheezing. Rescue 100 each 0    albuterol (PROVENTIL/VENTOLIN HFA) 90 mcg/actuation inhaler Inhale 2 puffs into the lungs every 4 (four) hours as needed for Wheezing. Rescue 8 g 2    azelastine (ASTELIN) 137 mcg (0.1 %) nasal spray 1 spray (137 mcg total) by Nasal route 2 (two) times daily. 30 mL 5    budesonide-glycopyr-formoterol (BREZTRI AEROSPHERE) 160-9-4.8 mcg/actuation HFAA Inhale 2 puffs into the lungs 2 (two) times a day. 10.7 g 3    cetirizine (ZYRTEC) 10 MG tablet Take 1 tablet (10 mg total) by mouth once daily. 90 tablet 0    fluticasone propionate (FLONASE) 50 mcg/actuation nasal spray Use 1 spray(s) in each nostril once daily 32 g 3    meloxicam (MOBIC) 15 MG tablet Take 1 tablet by mouth once daily 90 tablet 0    metFORMIN (GLUCOPHAGE-XR) 750 MG ER 24hr tablet Take 1 tablet by mouth once daily with breakfast 90 tablet 3    montelukast (SINGULAIR) 10 mg tablet TAKE 1 TABLET BY MOUTH ONCE DAILY IN THE EVENING 90 tablet 3    predniSONE (DELTASONE) 20 MG tablet Take 1 tablet (20 mg total) by mouth once daily. 7 tablet 0    rosuvastatin (CRESTOR) 10 MG tablet Take 1 tablet by mouth once daily 90 tablet 1    blood sugar diagnostic Strp To check BG 1 times daily, to use with insurance preferred meter 100 each 0    blood-glucose meter kit To check BG 1 times daily, to use with insurance preferred meter 1 each 0    glipiZIDE (GLUCOTROL) 10 MG tablet Take 1 tablet (10 mg total) by mouth 2 (two) times daily before meals. 180 tablet 3    lancets Misc To check BG 1 times daily, to use with insurance preferred meter 100 each 0    primidone (MYSOLINE) 50 MG Tab Take 2 tablets in the morning and 3 tablets in the afternoon 150 tablet 11    valsartan-hydrochlorothiazide (DIOVAN-HCT) 80-12.5 mg per tablet Take 1 tablet by mouth once daily. 90 tablet 3     No current facility-administered medications for this visit.

## 2025-05-13 ENCOUNTER — PATIENT OUTREACH (OUTPATIENT)
Dept: ADMINISTRATIVE | Facility: HOSPITAL | Age: 70
End: 2025-05-13
Payer: COMMERCIAL

## 2025-05-13 NOTE — LETTER
AUTHORIZATION FOR RELEASE OF   CONFIDENTIAL INFORMATION    Dear Dr. Madison Cornejo,     We are seeing Sushil Lucas, date of birth 1955, in the clinic at [facility]. Edgar Kam MD is the patient's PCP. Sushil Lucas has an outstanding lab/procedure at the time we reviewed his chart. In order to help keep his health information updated, he has authorized us to request the following medical record(s):       ( x )  EYE EXAM            Please fax records to [facility], Arnold, Ryan D., MD, [phone]     If you have any questions, please contact       Usha Urena  Nurse Clinical Care Coordinator  [facility]  Phone: [phone]  Fax: [phone]    Patient Name: Sushil Lucas  : 1955  Patient Phone #: [phone]                Sushil Lucas  MRN: [id number]  : 1955  Age: 68 y.o.  Sex: male         Patient/Legal Guardian Signature  This signature was collected at 2024    [facility]       _______________________________   Printed Name/Relationship to Patient      Consent for Examination and Treatment: I hereby authorize the providers and employees of [facility] ([facility]) to provide medical treatment/services which includes, but is not limited to, performing and administering tests and diagnostic procedures that are deemed necessary, including, but not limited to, imaging examinations, blood tests and other laboratory procedures as may be required by the hospital, clinic, or may be ordered by my physician(s) or persons working under the general and/or special instructions of my physician(s).      I understand and agree that this consent covers all authorized persons, including but not limited to physicians, residents, nurse practitioners, physicians' assistants, specialists, consultants, student nurses, and independently contracted physicians, who are called upon by the  physician in charge, to carry out the diagnostic procedures and medical or surgical treatment.     I hereby authorize [facility] to retain or dispose of any specimens or tissue, should there be such remaining from any test or procedure.     I hereby authorize and give consent for [facility] providers and employees to take photographs, images or videotapes of such diagnostic, surgical or treatment procedures of Patient as may be required by [facility] or as may be ordered by a physician. I further acknowledge and agree that [facility] may use cameras or other devices for patient monitoring.     I am aware that the practice of medicine is not an exact science, and I acknowledge that no guarantees have been made to me as to the outcome of any tests, procedures or treatment.     Authorization for Release of Information: I understand that my insurance company and/or their agents may need information necessary to make determinations about payment/reimbursement. I hereby provide authorization to release to all insurance companies, their successors, assignees, other parties with whom they may have contracted, or others acting on their behalf, that are involved with payment for any hospital and/or clinic charges incurred by the patient, any information that they request and deem necessary for payment/reimbursement, and/or quality review.  I further authorize the release of my health information to physicians or other health care practitioners on staff who are involved in my health care now and in the future, and to other health care providers, entities, or institutions for the purpose of my continued care and treatment, including referrals.     REGISTRATION AUTHORIZATION  Form No. 14047 (Rev. 3/25/2024)    Page 1 of 3                       Medicare Patient's Certification and Authorization to Release Information and Payment Request:  I certify that the information given by me in applying for payment under Title XVIII of the Social  Security Act is correct. I authorize any castle of medical or other information about me to release to the [facility], or its intermediaries or carriers, any information needed for this or a related Medicare claim. I request that payment of authorized benefits be made on my behalf.     Assignment of Insurance Benefits:   I hereby authorize any and all insurance companies, health plans, defined   benefit plans, health insurers or any entity that is or may be responsible for payment of my medical expenses to pay all hospital and medical benefits now due, and to become due and payable to me under any hospital benefits, sick benefits, injury benefits or any other benefit for services rendered to me, including Major Medical Benefits, direct to [facility] and all independently contracted physicians. I assign any and all rights that I may have against any and all insurance companies, health plans, defined benefit plans, health insurers or any entity that is or may be responsible for payment of my medical expenses, including, but not limited to any right to appeal a denial of a claim, any right to bring any action, lawsuit, administrative proceeding, or other cause of action on my behalf. I specifically assign my right to pursue litigation against any and all insurance companies, health plans, defined benefit plans, health insurers or any entity that is or may be responsible for payment of my medical expenses based upon a refusal to pay charges.            E. Valuables: It is understood and agreed that [facility] is not liable for the damage to or loss of any money, jewelry,   documents, dentures, eye glasses, hearing aids, prosthetics, or other property of value.     F. Computer Equipment: I understand and agree that should I choose to use computer equipment owned by [facility] or if I choose to access the Internet via [facility]s network, I do so at my own risk. [facility] is not responsible for any damage to my  computer equipment or to any damages of any type that might arise from my loss of equipment or data.     G. Acceptance of Financial Responsibility:  I agree that in consideration of the services and   supplies that have been   or will be furnished to the patient, I am hereby obligated to pay all charges made for or on the account of the patient according to the standard rates (in effect at the time the services and supplies are delivered) established by [facility], including its Patient Financial Assistance Policy to the extent it is applicable. I understand that I am responsible for all charges, or portions thereof, not covered by insurance or other sources. Patient refunds will be distributed only after balances at all [facility] facilities are paid.     H. Communication Authorization:  I hereby authorize [facility] and its representatives, along with any billing service   or  who may work on their behalf, to contact me on   my cell phone and/or home phone using pre- recorded messages, artificial voice messages, automatic telephone dialing devices or other computer assisted technology, or by electronic      mail, text messaging, or by any other form of electronic communication. This includes, but is not limited to, appointment reminders, yearly physical exam reminders, preventive care reminders, patient campaigns, welcome calls, and calls about account balances on my account or any account on which I am listed as a guarantor. I understand I have the right to opt out of these communications at any time.      Relationship  Between  Facility and  Provider:      I understand that some, but not all, providers furnishing services to the patient are not employees or agents of [facility]. The patient is under the care and supervision of his/her attending physician, and it is the responsibility of the facility and its nursing staff to carry out the instructions of such physicians. It is the responsibility of the  patient's physician/designee to obtain the patient's informed consent, when required, for medical or surgical treatment, special diagnostic or therapeutic procedures, or hospital services rendered for the patient under the special instructions of the physician/designee.           REGISTRATION AUTHORIZATION  Form No. 81941 (Rev. 3/25/2024)    Page 2 of 3                       Immunizations: [facility] shares immunization information with state sponsored health departments to help you and your doctor keep track of your immunization records. By signing, you consent to have this information shared with the health department in your state:                                [place] - LINKS ([place] Immunization Network for Kids Statewide)                                [place] - MIIX ([place] Immunization Information eXchange)                                [place] - ImmPRINT (Immunization Patient Registry with Integrated Technology)     TERM: This authorization is valid for this and subsequent care/treatment I receive at [facility] and will remain valid unless/until revoked in writing by me.     [facility]: As used in this document, [facility] means all [facility] owned and managed facilities, including, but not limited to, all health centers, surgery centers, clinics, urgent care centers, and hospitals.         [facility] complies with applicable Federal civil rights laws and does not discriminate on the basis of race, color, national origin, age, disability, or sex.  ATENCIÓN: si habla isa, tiene a valencia disposición servicios gratuitos de asistencia lingüística. Llraquel al [phone].  CHÚ Ý: N?u b?n nói Ti?ng Vi?t, có các d?ch v? h? tr? ngôn ng? mi?n phí dành cho b?n. G?i s? [phone].        REGISTRATION AUTHORIZATION  Form No. 00370 (Rev. 3/25/2024)   Page 3 of 3     Patient

## 2025-05-13 NOTE — PROGRESS NOTES
Population Health Chart Review & Patient Outreach Details      Additional [facility] Health Notes:               Updates Requested / Reviewed:      Updated Care Coordination Note, Care Everywhere, , Care Team Updated, and Immunizations Reconciliation Completed or Queried: [facility] Topics Overdue:      [facility] Score: 4     Eye Exam  Hemoglobin A1c  Uncontrolled BP  AAA Screening    Pneumonia Vaccine  Tetanus Vaccine  Shingles/Zoster Vaccine  RSV Vaccine                  Health Maintenance Topic(s) Outreach Outcomes & Actions Taken:    Eye Exam - Outreach Outcomes & Actions Taken  : External Records Requested & Care Team Updated if Applicable

## 2025-05-16 DIAGNOSIS — M19.90 ARTHRITIS: ICD-10-CM

## 2025-05-16 NOTE — TELEPHONE ENCOUNTER
Refill Routing Note   Medication(s) are not appropriate for processing by [facility] for the following reason(s):        Outside of protocol    [facility] action(s):  Route             Appointments  past 12m or future 3m with PCP    Date Provider   Last Visit   5/12/2025 Edgar Kam MD   Next Visit   8/12/2025 Edgar Kam MD   ED visits in past 90 days: 0        Note composed:9:13 AM 05/16/2025

## 2025-05-19 RX ORDER — MELOXICAM 15 MG/1
15 TABLET ORAL
Qty: 90 TABLET | Refills: 0 | Status: SHIPPED | OUTPATIENT
Start: 2025-05-19

## 2025-05-23 ENCOUNTER — PATIENT MESSAGE (OUTPATIENT)
Dept: ADMINISTRATIVE | Facility: HOSPITAL | Age: 70
End: 2025-05-23
Payer: COMMERCIAL

## 2025-05-29 ENCOUNTER — PATIENT MESSAGE (OUTPATIENT)
Dept: ADMINISTRATIVE | Facility: HOSPITAL | Age: 70
End: 2025-05-29
Payer: COMMERCIAL

## 2025-05-31 DIAGNOSIS — E11.9 TYPE 2 DIABETES MELLITUS WITHOUT COMPLICATION, WITHOUT LONG-TERM CURRENT USE OF INSULIN: ICD-10-CM

## 2025-06-02 ENCOUNTER — TELEPHONE (OUTPATIENT)
Dept: FAMILY MEDICINE | Facility: CLINIC | Age: 70
End: 2025-06-02
Payer: COMMERCIAL

## 2025-06-02 RX ORDER — METFORMIN HYDROCHLORIDE 750 MG/1
750 TABLET, EXTENDED RELEASE ORAL
Qty: 90 TABLET | Refills: 0 | Status: SHIPPED | OUTPATIENT
Start: 2025-06-02

## 2025-06-08 DIAGNOSIS — R06.2 WHEEZING: ICD-10-CM

## 2025-06-08 DIAGNOSIS — J06.9 UPPER RESPIRATORY TRACT INFECTION, UNSPECIFIED TYPE: ICD-10-CM

## 2025-06-08 NOTE — TELEPHONE ENCOUNTER
No care due was identified.  [facility] Embedded Care Due Messages. Reference number: [id number].   6/08/2025 11:38:21 AM CDT

## 2025-06-09 RX ORDER — CETIRIZINE HYDROCHLORIDE 10 MG/1
10 TABLET, FILM COATED ORAL
Qty: 90 TABLET | Refills: 0 | Status: SHIPPED | OUTPATIENT
Start: 2025-06-09

## 2025-06-09 NOTE — TELEPHONE ENCOUNTER
Refill Routing Note   Medication(s) are not appropriate for processing by [facility] for the following reason(s):        New or recently adjusted medication    [facility] action(s):  Defer               Appointments  past 12m or future 3m with PCP    Date Provider   Last Visit   5/12/2025 Edgar Kam MD   Next Visit   8/12/2025 Edgar Kam MD   ED visits in past 90 days: 0        Note composed:10:07 PM 06/08/2025

## 2025-06-12 ENCOUNTER — TELEPHONE (OUTPATIENT)
Dept: FAMILY MEDICINE | Facility: CLINIC | Age: 70
End: 2025-06-12
Payer: COMMERCIAL

## 2025-06-12 NOTE — TELEPHONE ENCOUNTER
----- Message from Madison sent at 6/12/2025  9:06 AM CDT -----  Pt wife speedy is calling for refill on new glipizide rx. Ayo montes [phone]

## 2025-06-12 NOTE — TELEPHONE ENCOUNTER
This medication was sent to the pharmacy on 5-12-25 for 180 tablets with 3 refills. Please check with pharmacy for  3 refills.

## 2025-06-17 ENCOUNTER — TELEPHONE (OUTPATIENT)
Dept: FAMILY MEDICINE | Facility: CLINIC | Age: 70
End: 2025-06-17
Payer: COMMERCIAL

## 2025-06-17 NOTE — TELEPHONE ENCOUNTER
Returned call to patient to advise both prescriptions were sent on 5/12/25. Patient's wife was also advise she could take 2 of the 40 mg until that prescription was finished. Received voicemail. Left detailed message and call back number.

## 2025-06-17 NOTE — TELEPHONE ENCOUNTER
----- Message from Cecily sent at 6/17/2025  8:09 AM CDT -----  Radha the patients wife called. When the patient  was seen last time. Dr. Kam increased his Glipizide 5 mg to 10 mg He needs the 10 mg filled. He is out. [facility] on [facility].  Valsartan 40 mg to 80 mg.  He has # 90 of the 40 mg Can he just take 2 pills to equal 80 mg until he runs out? Please call Radha and let her know. Radha's # [phone]

## 2025-08-04 RX ORDER — GLIPIZIDE 5 MG/1
5 TABLET, FILM COATED, EXTENDED RELEASE ORAL
Qty: 90 TABLET | Refills: 0 | OUTPATIENT
Start: 2025-08-04

## 2025-08-04 NOTE — TELEPHONE ENCOUNTER
No care due was identified.  [facility] Embedded Care Due Messages. Reference number: [id number].   8/03/2025 9:01:25 PM CDT

## 2025-08-05 NOTE — TELEPHONE ENCOUNTER
Refill Decision Note   Sushil Lance  is requesting a refill authorization.  Brief Assessment and Rationale for Refill:  Quick Discontinue     Medication Therapy Plan:  Dose adjustment 5/12/25      Comments:     Note composed:7:33 PM 08/04/2025

## 2025-08-18 DIAGNOSIS — M19.90 ARTHRITIS: ICD-10-CM

## 2025-08-25 DIAGNOSIS — M19.90 ARTHRITIS: ICD-10-CM

## 2025-08-25 RX ORDER — MELOXICAM 15 MG/1
15 TABLET ORAL
Qty: 90 TABLET | Refills: 0 | Status: SHIPPED | OUTPATIENT
Start: 2025-08-25

## 2025-08-25 RX ORDER — MELOXICAM 15 MG/1
15 TABLET ORAL DAILY
Qty: 90 TABLET | Refills: 0 | OUTPATIENT
Start: 2025-08-25